# Patient Record
Sex: MALE | Race: WHITE
[De-identification: names, ages, dates, MRNs, and addresses within clinical notes are randomized per-mention and may not be internally consistent; named-entity substitution may affect disease eponyms.]

---

## 2021-03-02 ENCOUNTER — HOSPITAL ENCOUNTER (EMERGENCY)
Dept: HOSPITAL 47 - EC | Age: 31
Discharge: HOME | End: 2021-03-02
Payer: COMMERCIAL

## 2021-03-02 VITALS
HEART RATE: 86 BPM | DIASTOLIC BLOOD PRESSURE: 89 MMHG | RESPIRATION RATE: 20 BRPM | TEMPERATURE: 98 F | SYSTOLIC BLOOD PRESSURE: 131 MMHG

## 2021-03-02 DIAGNOSIS — R11.10: ICD-10-CM

## 2021-03-02 DIAGNOSIS — F31.9: ICD-10-CM

## 2021-03-02 DIAGNOSIS — F41.9: Primary | ICD-10-CM

## 2021-03-02 DIAGNOSIS — Z90.49: ICD-10-CM

## 2021-03-02 DIAGNOSIS — F12.90: ICD-10-CM

## 2021-03-02 DIAGNOSIS — G89.29: ICD-10-CM

## 2021-03-02 DIAGNOSIS — G47.00: ICD-10-CM

## 2021-03-02 DIAGNOSIS — K21.9: ICD-10-CM

## 2021-03-02 DIAGNOSIS — M54.9: ICD-10-CM

## 2021-03-02 DIAGNOSIS — Z90.09: ICD-10-CM

## 2021-03-02 DIAGNOSIS — F17.200: ICD-10-CM

## 2021-03-02 DIAGNOSIS — F14.10: ICD-10-CM

## 2021-03-02 LAB
ALBUMIN SERPL-MCNC: 4.7 G/DL (ref 3.5–5)
ALP SERPL-CCNC: 128 U/L (ref 38–126)
ALT SERPL-CCNC: 160 U/L (ref 4–49)
ANION GAP SERPL CALC-SCNC: 13 MMOL/L
APTT BLD: 23 SEC (ref 22–30)
AST SERPL-CCNC: 191 U/L (ref 17–59)
BASOPHILS # BLD AUTO: 0.1 K/UL (ref 0–0.2)
BASOPHILS NFR BLD AUTO: 1 %
BILIRUB UR QL STRIP.AUTO: (no result)
BUN SERPL-SCNC: 9 MG/DL (ref 9–20)
CALCIUM SPEC-MCNC: 9 MG/DL (ref 8.4–10.2)
CHLORIDE SERPL-SCNC: 93 MMOL/L (ref 98–107)
CO2 SERPL-SCNC: 29 MMOL/L (ref 22–30)
EOSINOPHIL # BLD AUTO: 0.1 K/UL (ref 0–0.7)
EOSINOPHIL NFR BLD AUTO: 1 %
ERYTHROCYTE [DISTWIDTH] IN BLOOD BY AUTOMATED COUNT: 4.76 M/UL (ref 4.3–5.9)
ERYTHROCYTE [DISTWIDTH] IN BLOOD: 14.8 % (ref 11.5–15.5)
GLUCOSE SERPL-MCNC: 105 MG/DL (ref 74–99)
HCT VFR BLD AUTO: 46.8 % (ref 39–53)
HGB BLD-MCNC: 15.9 GM/DL (ref 13–17.5)
HYALINE CASTS UR QL AUTO: 1 /LPF (ref 0–2)
INR PPP: 1 (ref ?–1.2)
KETONES UR QL STRIP.AUTO: (no result)
LYMPHOCYTES # SPEC AUTO: 1 K/UL (ref 1–4.8)
LYMPHOCYTES NFR SPEC AUTO: 9 %
MCH RBC QN AUTO: 33.4 PG (ref 25–35)
MCHC RBC AUTO-ENTMCNC: 33.9 G/DL (ref 31–37)
MCV RBC AUTO: 98.3 FL (ref 80–100)
MONOCYTES # BLD AUTO: 0.8 K/UL (ref 0–1)
MONOCYTES NFR BLD AUTO: 7 %
NEUTROPHILS # BLD AUTO: 8.8 K/UL (ref 1.3–7.7)
NEUTROPHILS NFR BLD AUTO: 81 %
PH UR: 6.5 [PH] (ref 5–8)
PLATELET # BLD AUTO: 145 K/UL (ref 150–450)
POTASSIUM SERPL-SCNC: 3.1 MMOL/L (ref 3.5–5.1)
PROT SERPL-MCNC: 7.6 G/DL (ref 6.3–8.2)
PROT UR QL: (no result)
PT BLD: 10.5 SEC (ref 9–12)
RBC UR QL: 3 /HPF (ref 0–5)
SODIUM SERPL-SCNC: 135 MMOL/L (ref 137–145)
SP GR UR: 1.03 (ref 1–1.03)
SQUAMOUS UR QL AUTO: <1 /HPF (ref 0–4)
UROBILINOGEN UR QL STRIP: 4 MG/DL (ref ?–2)
WBC # BLD AUTO: 10.8 K/UL (ref 3.8–10.6)
WBC #/AREA URNS HPF: 1 /HPF (ref 0–5)

## 2021-03-02 PROCEDURE — 81001 URINALYSIS AUTO W/SCOPE: CPT

## 2021-03-02 PROCEDURE — 82075 ASSAY OF BREATH ETHANOL: CPT

## 2021-03-02 PROCEDURE — 36415 COLL VENOUS BLD VENIPUNCTURE: CPT

## 2021-03-02 PROCEDURE — 96361 HYDRATE IV INFUSION ADD-ON: CPT

## 2021-03-02 PROCEDURE — 85025 COMPLETE CBC W/AUTO DIFF WBC: CPT

## 2021-03-02 PROCEDURE — 80053 COMPREHEN METABOLIC PANEL: CPT

## 2021-03-02 PROCEDURE — 85730 THROMBOPLASTIN TIME PARTIAL: CPT

## 2021-03-02 PROCEDURE — 85610 PROTHROMBIN TIME: CPT

## 2021-03-02 PROCEDURE — 80306 DRUG TEST PRSMV INSTRMNT: CPT

## 2021-03-02 PROCEDURE — 99283 EMERGENCY DEPT VISIT LOW MDM: CPT

## 2021-03-02 PROCEDURE — 83735 ASSAY OF MAGNESIUM: CPT

## 2021-03-02 PROCEDURE — 96374 THER/PROPH/DIAG INJ IV PUSH: CPT

## 2021-03-02 PROCEDURE — 96375 TX/PRO/DX INJ NEW DRUG ADDON: CPT

## 2021-03-02 PROCEDURE — 82271 OCCULT BLOOD OTHER SOURCES: CPT

## 2021-03-02 NOTE — ED
Anxiety HPI





- General


Chief Complaint: Anxiety


Stated Complaint: Anxiety


Time Seen by Provider: 03/02/21 09:24


Source: patient, EMS


Mode of arrival: EMS





- History of Present Illness


Initial Comments: 


30-year-old male history of OCD, PTSD, borderline personality disorder 

presenting to the ER today for chief complaint of insomnia.  Patient states the 

past 3 nights she has not been SEEPING he states he's been very anxious and is 

upset with his neighbor.  Patient states he does drink but doesnt feel like he 

is withdrawing stating "i am very familar with that feeling, i would be 

sweating, and have abdominal pain".  He denies any headaches or hallucinations. 

He states he has had occasional nausea and episodes of vomiting which happens 

with these "panic attacks" or if he forgets to take his protonix which he states

he did not take this mroning. Patient denies fevers, diarrhea, black/dark stools

or bloody stools. Denies chest pain, dyspnea, dyspnea on exertion, leg swelling,

jaundice. Patient states that he believes he just needs to sleep. Patient states

he does have back pain, but states this is chronic lower midline. He states when

he is anxious he feels all his chronic pains more and usually has worsening back

pain during these attacks. Patient denies suicidal or homicidal ideations. Pt 

states he came in because he just needs to get some sleep> Remaining ROS (-). 

upon arrival patient does not appear acutely psychotic he is answering questions

appropriately.








- Related Data


Home Medications: 


                                Home Medications











 Medication  Instructions  Recorded  Confirmed


 


Pantoprazole Sodium [Protonix] 40 mg PO DAILY 03/02/21 03/02/21


 


QUEtiapine [SEROquel] 400 mg PO HS 03/02/21 03/02/21


 


Vortioxetine Hydrobromide 5 mg PO HS 03/02/21 03/02/21





[Trintellix]   











Allergies/Adverse Reactions: 


                                    Allergies











Allergy/AdvReac Type Severity Reaction Status Date / Time


 


No Known Allergies Allergy   Verified 03/02/21 10:32














Review of Systems


ROS Statement: 


Those systems with pertinent positive or pertinent negative responses have been 

documented in the HPI.





ROS Other: All systems not noted in ROS Statement are negative.





Past Medical History


Past Medical History: GERD/Reflux


Additional Past Medical History / Comment(s): migraines, OCD


History of Any Multi-Drug Resistant Organisms: None Reported


Past Surgical History: Appendectomy, Cholecystectomy, Hernia Repair, 

Tonsillectomy


Past Psychological History: ADD/ADHD, Anxiety, Bipolar


Smoking Status: Current every day smoker


Past Alcohol Use History: Occasional


Past Drug Use History: Marijuana





General Exam





- General Exam Comments


Initial Comments: 


General:  The patient is awake and alert, in no distress


Eye:  Pupils are equal, round and reactive to light, extra-ocular movements are 

intact.  No nystagmus.  There is normal conjunctiva bilaterally.  No signs of 

icterus.  


Ears, nose, mouth and throat:  There are moist mucous membranes and no oral 

lesions. 


Neck:  The neck is supple, there is no tenderness or JVD.  


Cardiovascular:  There is a regular rate and rhythm. No murmur, rub or gallop is

 appreciated.


Respiratory:  Lungs are clear to auscultation, respirations are non-labored, 

breath sounds are equal.  No wheezes, stridor, rales, or rhonchi.


Gastrointestinal:  Soft, non-distended, non-tender abdomen without masses or 

organomegaly noted. There is no rebound or guarding present.  No CVA tenderness


Musculoskeletal: Some mild paraspinal tenderness to palpation fo the lumbar 

spine, remaining exam unremarkable, no midline tenderness. No bruising. No 

redness. Normal ROM, no tenderness.  Strength 5/5 of the LE b/l. Sensation 

intact of the LE b/l-including the saddle region. Radial and DP pulses equal 

bilaterally 2+.  


Neurological:  A&O x 3. CN II-XII intact grossly, There are no obvious motor or 

sensory deficits. Coordination appears grossly intact. Speech is normal.


Skin:  Skin is warm and dry and no rashes or lesions are noted. 


Psychiatric:  Cooperative, appropriate mood & affect, normal judgment.  





Limitations: no limitations





Course


                                   Vital Signs











  03/02/21 03/02/21





  09:23 14:29


 


Temperature 97.7 F 98 F


 


Pulse Rate 74 86


 


Respiratory 22 20





Rate  


 


Blood Pressure 138/95 131/89


 


O2 Sat by Pulse 95 99





Oximetry  














- Reevaluation(s)


Reevaluation #1: 


Episode of coffee ground appearing emesis. pt continues to deny physical 

complaints, aside from chronic low back pain. abdomen continues to be soft no

ntender, no crepitus to palpation of chest wall. Patient states that he KNOWS he

 is not withdrawing. He states he does take protonix daily and has bad GERD that

 sometimes causes him to vomit. Missed dose today.


03/02/21 12:41





Medical Decision Making





- Medical Decision Making


Patient presenting for anxiety/insomnia. Patient has chronic back pain, states 

it is worse, Evelyne flank pain/blood in urine. He states he has had an episode 

of vomiting today which happens when he gets worked up or doesnt take his 

protonix-which both are occurring today. Patient had one dark episode of 

vomiting but states it was the "pepsi" he was drinking. Gastric occult (-). HgB 

stable. No hypotension/tachycardia. Patient vomiting controlled. Pt does have 

mild hypokalemia/hypomagensium--both replaced orally. Anxiety improved. 

Evaluated by EPS who recommended discharge she is can go to his 3PM [facility] 

appointment. Patient agreeable to this care plan and prefers discharge at this 

time> Discussed case with Dr> Benoit who is agreeable to this care plan.








- Lab Data


Result diagrams: 


                                 03/02/21 12:45





                                 03/02/21 12:45


                                   Lab Results











  03/02/21 03/02/21 03/02/21 Range/Units





  12:38 12:45 12:45 


 


WBC   10.8 H   (3.8-10.6)  k/uL


 


RBC   4.76   (4.30-5.90)  m/uL


 


Hgb   15.9   (13.0-17.5)  gm/dL


 


Hct   46.8   (39.0-53.0)  %


 


MCV   98.3   (80.0-100.0)  fL


 


MCH   33.4   (25.0-35.0)  pg


 


MCHC   33.9   (31.0-37.0)  g/dL


 


RDW   14.8   (11.5-15.5)  %


 


Plt Count   145 L   (150-450)  k/uL


 


MPV   7.8   


 


Neutrophils %   81   %


 


Lymphocytes %   9   %


 


Monocytes %   7   %


 


Eosinophils %   1   %


 


Basophils %   1   %


 


Neutrophils #   8.8 H   (1.3-7.7)  k/uL


 


Lymphocytes #   1.0   (1.0-4.8)  k/uL


 


Monocytes #   0.8   (0-1.0)  k/uL


 


Eosinophils #   0.1   (0-0.7)  k/uL


 


Basophils #   0.1   (0-0.2)  k/uL


 


PT     (9.0-12.0)  sec


 


INR     (<1.2)  


 


APTT     (22.0-30.0)  sec


 


Sodium    135 L  (137-145)  mmol/L


 


Potassium    3.1 L  (3.5-5.1)  mmol/L


 


Chloride    93 L  ()  mmol/L


 


Carbon Dioxide    29  (22-30)  mmol/L


 


Anion Gap    13  mmol/L


 


BUN    9  (9-20)  mg/dL


 


Creatinine    0.57 L  (0.66-1.25)  mg/dL


 


Est GFR (CKD-EPI)AfAm    >90  (>60 ml/min/1.73 sqM)  


 


Est GFR (CKD-EPI)NonAf    >90  (>60 ml/min/1.73 sqM)  


 


Glucose    105 H  (74-99)  mg/dL


 


Calcium    9.0  (8.4-10.2)  mg/dL


 


Magnesium     (1.6-2.3)  mg/dL


 


Total Bilirubin    1.1  (0.2-1.3)  mg/dL


 


AST    191 H  (17-59)  U/L


 


ALT    160 H  (4-49)  U/L


 


Alkaline Phosphatase    128 H  ()  U/L


 


Total Protein    7.6  (6.3-8.2)  g/dL


 


Albumin    4.7  (3.5-5.0)  g/dL


 


Urine Color  Yellow    


 


Urine Appearance  Clear    (Clear)  


 


Urine pH  6.5    (5.0-8.0)  


 


Ur Specific Gravity  1.032    (1.001-1.035)  


 


Urine Protein  2+ H    (Negative)  


 


Urine Glucose (UA)  Negative    (Negative)  


 


Urine Ketones  4+ H    (Negative)  


 


Urine Blood  Negative    (Negative)  


 


Urine Nitrite  Negative    (Negative)  


 


Urine Bilirubin  1+ H    (Negative)  


 


Urine Urobilinogen  4.0    (<2.0)  mg/dL


 


Ur Leukocyte Esterase  Negative    (Negative)  


 


Urine RBC  3    (0-5)  /hpf


 


Urine WBC  1    (0-5)  /hpf


 


Ur Squamous Epith Cells  <1    (0-4)  /hpf


 


Hyaline Casts  1    (0-2)  /lpf


 


Urine Mucus  Many H    (None)  /hpf


 


Gastric Occult Blood     (Negative)  


 


Urine Opiates Screen  Detected H    (NotDetected)  


 


Ur Oxycodone Screen  Not Detected    (NotDetected)  


 


Urine Methadone Screen  Detected H    (NotDetected)  


 


Ur Propoxyphene Screen  Not Detected    (NotDetected)  


 


Ur Barbiturates Screen  Not Detected    (NotDetected)  


 


U Tricyclic Antidepress  Detected H    (NotDetected)  


 


Ur Phencyclidine Scrn  Not Detected    (NotDetected)  


 


Ur Amphetamines Screen  Not Detected    (NotDetected)  


 


U Methamphetamines Scrn  Not Detected    (NotDetected)  


 


U Benzodiazepines Scrn  Not Detected    (NotDetected)  


 


Urine Cocaine Screen  Detected H    (NotDetected)  


 


U Marijuana (THC) Screen  Detected H    (NotDetected)  














  03/02/21 03/02/21 03/02/21 Range/Units





  12:45 12:45 13:10 


 


WBC     (3.8-10.6)  k/uL


 


RBC     (4.30-5.90)  m/uL


 


Hgb     (13.0-17.5)  gm/dL


 


Hct     (39.0-53.0)  %


 


MCV     (80.0-100.0)  fL


 


MCH     (25.0-35.0)  pg


 


MCHC     (31.0-37.0)  g/dL


 


RDW     (11.5-15.5)  %


 


Plt Count     (150-450)  k/uL


 


MPV     


 


Neutrophils %     %


 


Lymphocytes %     %


 


Monocytes %     %


 


Eosinophils %     %


 


Basophils %     %


 


Neutrophils #     (1.3-7.7)  k/uL


 


Lymphocytes #     (1.0-4.8)  k/uL


 


Monocytes #     (0-1.0)  k/uL


 


Eosinophils #     (0-0.7)  k/uL


 


Basophils #     (0-0.2)  k/uL


 


PT  10.5    (9.0-12.0)  sec


 


INR  1.0    (<1.2)  


 


APTT  23.0    (22.0-30.0)  sec


 


Sodium     (137-145)  mmol/L


 


Potassium     (3.5-5.1)  mmol/L


 


Chloride     ()  mmol/L


 


Carbon Dioxide     (22-30)  mmol/L


 


Anion Gap     mmol/L


 


BUN     (9-20)  mg/dL


 


Creatinine     (0.66-1.25)  mg/dL


 


Est GFR (CKD-EPI)AfAm     (>60 ml/min/1.73 sqM)  


 


Est GFR (CKD-EPI)NonAf     (>60 ml/min/1.73 sqM)  


 


Glucose     (74-99)  mg/dL


 


Calcium     (8.4-10.2)  mg/dL


 


Magnesium   1.4 L   (1.6-2.3)  mg/dL


 


Total Bilirubin     (0.2-1.3)  mg/dL


 


AST     (17-59)  U/L


 


ALT     (4-49)  U/L


 


Alkaline Phosphatase     ()  U/L


 


Total Protein     (6.3-8.2)  g/dL


 


Albumin     (3.5-5.0)  g/dL


 


Urine Color     


 


Urine Appearance     (Clear)  


 


Urine pH     (5.0-8.0)  


 


Ur Specific Gravity     (1.001-1.035)  


 


Urine Protein     (Negative)  


 


Urine Glucose (UA)     (Negative)  


 


Urine Ketones     (Negative)  


 


Urine Blood     (Negative)  


 


Urine Nitrite     (Negative)  


 


Urine Bilirubin     (Negative)  


 


Urine Urobilinogen     (<2.0)  mg/dL


 


Ur Leukocyte Esterase     (Negative)  


 


Urine RBC     (0-5)  /hpf


 


Urine WBC     (0-5)  /hpf


 


Ur Squamous Epith Cells     (0-4)  /hpf


 


Hyaline Casts     (0-2)  /lpf


 


Urine Mucus     (None)  /hpf


 


Gastric Occult Blood    Negative  (Negative)  


 


Urine Opiates Screen     (NotDetected)  


 


Ur Oxycodone Screen     (NotDetected)  


 


Urine Methadone Screen     (NotDetected)  


 


Ur Propoxyphene Screen     (NotDetected)  


 


Ur Barbiturates Screen     (NotDetected)  


 


U Tricyclic Antidepress     (NotDetected)  


 


Ur Phencyclidine Scrn     (NotDetected)  


 


Ur Amphetamines Screen     (NotDetected)  


 


U Methamphetamines Scrn     (NotDetected)  


 


U Benzodiazepines Scrn     (NotDetected)  


 


Urine Cocaine Screen     (NotDetected)  


 


U Marijuana (THC) Screen     (NotDetected)  














Disposition


Clinical Impression: 


 Anxiety, Chronic back pain, Insomnia, Vomiting, Cocaine abuse





Disposition: HOME SELF-CARE


Condition: Good


Instructions (If sedation given, give patient instructions):  Generalized 

Anxiety Disorder (ED), Insomnia (ED)


Additional Instructions: 


Please use medication as discussed.  Please follow-up with family doctor in the 

next 2 day. Please follow-up with   Please return to emergency room if the 

symptoms increase or worsen or for any other concerns.


Is patient prescribed a controlled substance at d/c from ED?: No


Referrals: 


None,Stated [Primary Care Provider] - 1-2 days


[facility] - 1[facility]2 days


Time of Disposition: 14:03

## 2021-03-13 ENCOUNTER — HOSPITAL ENCOUNTER (EMERGENCY)
Dept: HOSPITAL 47 - EC | Age: 31
Discharge: HOME | End: 2021-03-13
Payer: COMMERCIAL

## 2021-03-13 VITALS
DIASTOLIC BLOOD PRESSURE: 98 MMHG | HEART RATE: 117 BPM | RESPIRATION RATE: 18 BRPM | SYSTOLIC BLOOD PRESSURE: 155 MMHG | TEMPERATURE: 97.9 F

## 2021-03-13 DIAGNOSIS — F32.9: ICD-10-CM

## 2021-03-13 DIAGNOSIS — Z76.0: Primary | ICD-10-CM

## 2021-03-13 DIAGNOSIS — K21.9: ICD-10-CM

## 2021-03-13 DIAGNOSIS — F17.200: ICD-10-CM

## 2021-03-13 DIAGNOSIS — F41.9: ICD-10-CM

## 2021-03-13 PROCEDURE — 99283 EMERGENCY DEPT VISIT LOW MDM: CPT

## 2021-03-13 NOTE — ED
General Adult HPI





- General


Chief complaint: Recheck/Abnormal Lab/Rx


Stated complaint: Nausea, Vomiting


Time Seen by Provider: 03/13/21 16:12


Source: patient


Mode of arrival: ambulatory


Limitations: no limitations





- History of Present Illness


Initial comments: 





Dictation was produced using dragon dictation software. please excuse any gr

ammatical, word or spelling errors. 





This patient was cared for during a federal and state declared state of 

emergency secondary to Covid 19





Chief Complaint: 30-year-old male presents for Protonix refill





History of Present Illness: Patient is a 30-year-old male who recently moved to 

[place].  Patient states he has a prescription for Protonix.  640 mg daily.  

Patient states he has regular reflux symptoms.  He is in the middle of changing 

primary care physicians.  Patient has no medical complaints at this time.








The ROS documented in this emergency department record has been reviewed and 

confirmed by me.  Those systems with pertinent positive or negative responses 

have been documented in the HPI.  All other systems are other negative and/or 

noncontributory.








PHYSICAL EXAM:


General Impression: Alert and oriented x3, not in acute distress


HEENT: Normocephalic atraumatic, extra-ocular movements intact, pupils equal and

reactive to light bilaterally, mucous membranes moist.


Cardiovascular: Heart regular rate and rhythm


Chest: Able to complete full sentences, no retractions, no tachypnea


Abdomen: abdomen soft, non-tender, non-distended, no organomegaly


Musculoskeletal: Pulses present and equal in all extremities, no peripheral 

edema


Motor:  no focal deficits noted


Neurological: CN II-XII grossly intact, no focal motor or sensory deficits noted


Skin: Intact with no visualized rashes


Psych: Normal affect and mood





ED course: The-year-old Male presents for request for Protonix refill.  Vital 

signs upon arrival are within acceptable limits.  Physical examination is b

enign.  Patient given refill.  Is also given referral to GI for outpatient 

management of his reflux symptoms.

















- Related Data


                                Home Medications











 Medication  Instructions  Recorded  Confirmed


 


Pantoprazole Sodium [Protonix] 40 mg PO DAILY 03/02/21 03/02/21


 


QUEtiapine [SEROquel] 400 mg PO HS 03/02/21 03/02/21


 


Vortioxetine Hydrobromide 5 mg PO HS 03/02/21 03/02/21





[Trintellix]   








                                  Previous Rx's











 Medication  Instructions  Recorded


 


Pantoprazole Sodium [Protonix] 40 mg PO DAILY 24 Days #24 03/13/21





 tablet. 











                                    Allergies











Allergy/AdvReac Type Severity Reaction Status Date / Time


 


No Known Allergies Allergy   Verified 03/13/21 16:05














Review of Systems


ROS Statement: 


Those systems with pertinent positive or pertinent negative responses have been 

documented in the HPI.





ROS Other: All systems not noted in ROS Statement are negative.





Past Medical History


Past Medical History: GERD/Reflux


Additional Past Medical History / Comment(s): migraines, OCD


History of Any Multi-Drug Resistant Organisms: None Reported


Past Surgical History: Appendectomy, Cholecystectomy, Hernia Repair, 

Tonsillectomy


Past Psychological History: ADD/ADHD, Anxiety, Bipolar


Smoking Status: Current every day smoker


Past Alcohol Use History: Occasional


Past Drug Use History: Marijuana





General Exam


Limitations: no limitations





Course





                                   Vital Signs











  03/13/21





  16:05


 


Temperature 97.9 F


 


Pulse Rate 117 H


 


Respiratory 18





Rate 


 


Blood Pressure 155/98


 


O2 Sat by Pulse 97





Oximetry 














Disposition


Clinical Impression: 


 Encounter for medication refill





Disposition: HOME SELF-CARE


Condition: Good


Instructions (If sedation given, give patient instructions):  Gastroesophageal 

Reflux Disease (ED)


Prescriptions: 


Pantoprazole Sodium [Protonix] 40 mg PO DAILY 24 Days #24 tablet.


Is patient prescribed a controlled substance at d/c from ED?: No


Referrals: 


Tyson Aguilar MD [STAFF PHYSICIAN] - 1-2 days


Time of Disposition: 16:19

## 2021-06-02 ENCOUNTER — HOSPITAL ENCOUNTER (EMERGENCY)
Dept: HOSPITAL 47 - EC | Age: 31
Discharge: LEFT BEFORE DISCHARGE | End: 2021-06-02
Payer: COMMERCIAL

## 2021-06-02 VITALS
TEMPERATURE: 97 F | DIASTOLIC BLOOD PRESSURE: 103 MMHG | SYSTOLIC BLOOD PRESSURE: 139 MMHG | HEART RATE: 80 BPM | RESPIRATION RATE: 18 BRPM

## 2021-06-02 DIAGNOSIS — F31.9: ICD-10-CM

## 2021-06-02 DIAGNOSIS — K21.9: ICD-10-CM

## 2021-06-02 DIAGNOSIS — F12.90: ICD-10-CM

## 2021-06-02 DIAGNOSIS — F41.9: ICD-10-CM

## 2021-06-02 DIAGNOSIS — R74.01: ICD-10-CM

## 2021-06-02 DIAGNOSIS — R41.0: ICD-10-CM

## 2021-06-02 DIAGNOSIS — R55: ICD-10-CM

## 2021-06-02 DIAGNOSIS — E87.6: Primary | ICD-10-CM

## 2021-06-02 DIAGNOSIS — F17.200: ICD-10-CM

## 2021-06-02 DIAGNOSIS — R56.9: ICD-10-CM

## 2021-06-02 LAB
ALBUMIN SERPL-MCNC: 4.7 G/DL (ref 3.5–5)
ALP SERPL-CCNC: 230 U/L (ref 38–126)
ALT SERPL-CCNC: 163 U/L (ref 4–49)
ANION GAP SERPL CALC-SCNC: 22 MMOL/L
AST SERPL-CCNC: 300 U/L (ref 17–59)
BASOPHILS # BLD AUTO: 0.1 K/UL (ref 0–0.2)
BASOPHILS NFR BLD AUTO: 1 %
BUN SERPL-SCNC: 7 MG/DL (ref 9–20)
CALCIUM SPEC-MCNC: 8.8 MG/DL (ref 8.4–10.2)
CHLORIDE SERPL-SCNC: 88 MMOL/L (ref 98–107)
CO2 SERPL-SCNC: 22 MMOL/L (ref 22–30)
EOSINOPHIL # BLD AUTO: 0.1 K/UL (ref 0–0.7)
EOSINOPHIL NFR BLD AUTO: 1 %
ERYTHROCYTE [DISTWIDTH] IN BLOOD BY AUTOMATED COUNT: 4.34 M/UL (ref 4.3–5.9)
ERYTHROCYTE [DISTWIDTH] IN BLOOD: 13.9 % (ref 11.5–15.5)
GLUCOSE BLD-MCNC: 153 MG/DL (ref 75–99)
GLUCOSE SERPL-MCNC: 145 MG/DL (ref 74–99)
HCT VFR BLD AUTO: 44.1 % (ref 39–53)
HGB BLD-MCNC: 15.1 GM/DL (ref 13–17.5)
LYMPHOCYTES # SPEC AUTO: 0.7 K/UL (ref 1–4.8)
LYMPHOCYTES NFR SPEC AUTO: 10 %
MAGNESIUM SPEC-SCNC: 2.2 MG/DL (ref 1.6–2.3)
MCH RBC QN AUTO: 34.9 PG (ref 25–35)
MCHC RBC AUTO-ENTMCNC: 34.3 G/DL (ref 31–37)
MCV RBC AUTO: 101.7 FL (ref 80–100)
MONOCYTES # BLD AUTO: 0.7 K/UL (ref 0–1)
MONOCYTES NFR BLD AUTO: 9 %
NEUTROPHILS # BLD AUTO: 6 K/UL (ref 1.3–7.7)
NEUTROPHILS NFR BLD AUTO: 79 %
PLATELET # BLD AUTO: 82 K/UL (ref 150–450)
POTASSIUM SERPL-SCNC: 2.7 MMOL/L (ref 3.5–5.1)
PROT SERPL-MCNC: 7.2 G/DL (ref 6.3–8.2)
SODIUM SERPL-SCNC: 132 MMOL/L (ref 137–145)
WBC # BLD AUTO: 7.6 K/UL (ref 3.8–10.6)

## 2021-06-02 PROCEDURE — 83735 ASSAY OF MAGNESIUM: CPT

## 2021-06-02 PROCEDURE — 96361 HYDRATE IV INFUSION ADD-ON: CPT

## 2021-06-02 PROCEDURE — 84100 ASSAY OF PHOSPHORUS: CPT

## 2021-06-02 PROCEDURE — 99284 EMERGENCY DEPT VISIT MOD MDM: CPT

## 2021-06-02 PROCEDURE — 36415 COLL VENOUS BLD VENIPUNCTURE: CPT

## 2021-06-02 PROCEDURE — 85025 COMPLETE CBC W/AUTO DIFF WBC: CPT

## 2021-06-02 PROCEDURE — 96374 THER/PROPH/DIAG INJ IV PUSH: CPT

## 2021-06-02 PROCEDURE — 80053 COMPREHEN METABOLIC PANEL: CPT

## 2021-06-02 PROCEDURE — 93005 ELECTROCARDIOGRAM TRACING: CPT

## 2021-06-02 PROCEDURE — 80320 DRUG SCREEN QUANTALCOHOLS: CPT

## 2021-06-02 NOTE — ED
General Adult HPI





- General


Chief complaint: Fall


Stated complaint: Syncope/Possible Seizures


Time Seen by Provider: 06/02/21 09:55


Source: patient, EMS


Mode of arrival: EMS


Limitations: no limitations





- History of Present Illness


Initial comments: 


30 year-old male patient presents to the emergency department for evaluation 

after possibly having a seizure. Patient was at the store.  reports that 

he was about to check out when he "fainted", reportedly he did exhibit some body

twitching, and was confused when he woke up. Patient did bite his lip and 

tongue. Denies any loss of bowel or bladder control. Patient denies ever having 

a seizure in the past. Does admit to drinking a pint of alcohol three times 

weekly. States he did have a pint of vodka at around 0800 this morning. He 

denies any current headache, blurred vision, double vision, nausea, or vomiting.

Denies any recent diarrhea, fever, or chills. Denies chest pain or shortness of 

breath.   Patient denies any recent rash, cough, shortness of breath, chest 

pain, abdominal pain, constipation, back pain, numbness, tingling, dizziness, 

weakness, hematuria, dysuria, urinary urgency, urinary frequency, or any other 

complaints.





- Related Data


                                Home Medications











 Medication  Instructions  Recorded  Confirmed


 


QUEtiapine FUMARATE [SEROquel] 600 mg PO HS 06/02/21 06/02/21


 


Vortioxetine Hydrobromide 20 mg PO HS 06/02/21 06/02/21





[Trintellix]   








                                  Previous Rx's











 Medication  Instructions  Recorded


 


Pantoprazole Sodium [Protonix] 40 mg PO DAILY 24 Days #24 03/13/21





 tablet. 











                                    Allergies











Allergy/AdvReac Type Severity Reaction Status Date / Time


 


No Known Allergies Allergy   Verified 06/02/21 11:08














Review of Systems


ROS Statement: 


Those systems with pertinent positive or pertinent negative responses have been 

documented in the HPI.





ROS Other: All systems not noted in ROS Statement are negative.





Past Medical History


Past Medical History: GERD/Reflux, Seizure Disorder


Additional Past Medical History / Comment(s): migraines, OCD


History of Any Multi-Drug Resistant Organisms: None Reported


Past Surgical History: Appendectomy, Cholecystectomy, Hernia Repair, 

Tonsillectomy


Additional Past Surgical History / Comment(s): 7 hernias


Past Psychological History: ADD/ADHD, Anxiety, Bipolar


Smoking Status: Current every day smoker


Past Alcohol Use History: Heavy, Occasional


Past Drug Use History: Marijuana





General Exam


Limitations: no limitations


General appearance: alert, in no apparent distress


Eye exam: Present: normal appearance, PERRL, EOMI.  Absent: scleral icterus, 

conjunctival injection, periorbital swelling


ENT exam: Present: normal exam, normal oropharynx, mucous membranes moist


Respiratory exam: Present: normal lung sounds bilaterally.  Absent: respiratory 

distress, wheezes, rales, rhonchi, stridor


Cardiovascular Exam: Present: regular rate, normal rhythm, normal heart sounds. 

Absent: systolic murmur, diastolic murmur, rubs, gallop, clicks


GI/Abdominal exam: Present: soft, normal bowel sounds.  Absent: distended, 

tenderness, guarding, rebound, rigid


Neurological exam: Present: alert, oriented X3, CN II-XII intact


Psychiatric exam: Present: normal affect, normal mood


Skin exam: Present: warm, dry, intact, normal color.  Absent: rash





Course


                                   Vital Signs











  06/02/21 06/02/21





  09:38 11:32


 


Temperature 98.5 F 97.0 F L


 


Pulse Rate 86 80


 


Respiratory 20 18





Rate  


 


Blood Pressure 143/93 139/103


 


O2 Sat by Pulse 94 L 96





Oximetry  














EKG Findings





- EKG Comments:


EKG Findings:: EKG obtained at 957 shows normal sinus rhythm with a prolonged QT

interval.  Ventricular is 72, NJ interval 128, QRS duration 92, QTc 464, QTC 

508.  No evidence of ST elevation or depression.





Medical Decision Making





- Medical Decision Making


30 year-old male patient presented for syncope vs seizure. Physical exam is 

unremarkable. He is neurologically intact, no focal deficits. Labs reviewed and 

showed critically low potassium. Transaminitis. Normal magnesium. EKG showed 

prolonged QT interval. Plan was for admission for possible alcohol withdrawal 

seizure and hypokalemia. While waiting for test results patient did refuse all 

further care and treatment and left AGAINST MEDICAL ADVICE.  I was not able to 

speak to the patient prior to his leaving however I did call him and informed 

him of his lab results, discussed his critically low potassium and discussed 

risk of possible arrhythmia and death due to this.  Patient stated, "I underst

and, I'll come back another day but not today".  I reiterated the risks of not 

returning.  Patient again verbalized understanding.  He is instructed to return 

as soon as possible for further care and treatment. Case discussed with my 

attending Dr. Torres.








- Lab Data


Result diagrams: 


                                 06/02/21 10:11





                                 06/02/21 10:11


                                   Lab Results











  06/02/21 06/02/21 06/02/21 Range/Units





  10:05 10:11 10:11 


 


WBC   7.6   (3.8-10.6)  k/uL


 


RBC   4.34   (4.30-5.90)  m/uL


 


Hgb   15.1   (13.0-17.5)  gm/dL


 


Hct   44.1   (39.0-53.0)  %


 


MCV   101.7 H   (80.0-100.0)  fL


 


MCH   34.9   (25.0-35.0)  pg


 


MCHC   34.3   (31.0-37.0)  g/dL


 


RDW   13.9   (11.5-15.5)  %


 


Plt Count   82 L   (150-450)  k/uL


 


MPV   9.2   


 


Neutrophils %   79   %


 


Lymphocytes %   10   %


 


Monocytes %   9   %


 


Eosinophils %   1   %


 


Basophils %   1   %


 


Neutrophils #   6.0   (1.3-7.7)  k/uL


 


Lymphocytes #   0.7 L   (1.0-4.8)  k/uL


 


Monocytes #   0.7   (0-1.0)  k/uL


 


Eosinophils #   0.1   (0-0.7)  k/uL


 


Basophils #   0.1   (0-0.2)  k/uL


 


Manual Slide Review   Performed   


 


Macrocytosis   Slight   


 


Sodium    132 L  (137-145)  mmol/L


 


Potassium    2.7 L*  (3.5-5.1)  mmol/L


 


Chloride    88 L  ()  mmol/L


 


Carbon Dioxide    22  (22-30)  mmol/L


 


Anion Gap    22  mmol/L


 


BUN    7 L  (9-20)  mg/dL


 


Creatinine    0.56 L  (0.66-1.25)  mg/dL


 


Est GFR (CKD-EPI)AfAm    >90  (>60 ml/min/1.73 sqM)  


 


Est GFR (CKD-EPI)NonAf    >90  (>60 ml/min/1.73 sqM)  


 


Glucose    145 H  (74-99)  mg/dL


 


POC Glucose (mg/dL)  153 H    (75-99)  mg/dL


 


POC Glu Operater ID  Thomas Baig    


 


Calcium    8.8  (8.4-10.2)  mg/dL


 


Phosphorus    3.6  (2.5-4.5)  mg/dL


 


Magnesium    2.2  (1.6-2.3)  mg/dL


 


Total Bilirubin    2.3 H  (0.2-1.3)  mg/dL


 


AST    300 H  (17-59)  U/L


 


ALT    163 H  (4-49)  U/L


 


Alkaline Phosphatase    230 H  ()  U/L


 


Total Protein    7.2  (6.3-8.2)  g/dL


 


Albumin    4.7  (3.5-5.0)  g/dL


 


Serum Alcohol    <10  mg/dL














Disposition


Clinical Impression: 


 Hypokalemia, Seizure, Transaminitis





Disposition: Left Against Medical Advice


Condition: Undetermined


Referrals: 


None,Stated [Primary Care Provider] - 1-2 days

## 2021-08-19 ENCOUNTER — HOSPITAL ENCOUNTER (INPATIENT)
Dept: HOSPITAL 47 - EC | Age: 31
LOS: 2 days | Discharge: LEFT BEFORE DISCHARGE | DRG: 894 | End: 2021-08-21
Attending: FAMILY MEDICINE | Admitting: FAMILY MEDICINE
Payer: COMMERCIAL

## 2021-08-19 DIAGNOSIS — F17.200: ICD-10-CM

## 2021-08-19 DIAGNOSIS — S00.31XA: ICD-10-CM

## 2021-08-19 DIAGNOSIS — R45.851: ICD-10-CM

## 2021-08-19 DIAGNOSIS — K21.9: ICD-10-CM

## 2021-08-19 DIAGNOSIS — R40.20: ICD-10-CM

## 2021-08-19 DIAGNOSIS — G40.909: ICD-10-CM

## 2021-08-19 DIAGNOSIS — Z79.82: ICD-10-CM

## 2021-08-19 DIAGNOSIS — S00.81XA: ICD-10-CM

## 2021-08-19 DIAGNOSIS — Z79.899: ICD-10-CM

## 2021-08-19 DIAGNOSIS — K70.10: ICD-10-CM

## 2021-08-19 DIAGNOSIS — F10.229: Primary | ICD-10-CM

## 2021-08-19 DIAGNOSIS — F33.2: ICD-10-CM

## 2021-08-19 DIAGNOSIS — S00.83XA: ICD-10-CM

## 2021-08-19 DIAGNOSIS — S01.511A: ICD-10-CM

## 2021-08-19 DIAGNOSIS — E87.6: ICD-10-CM

## 2021-08-19 DIAGNOSIS — F42.9: ICD-10-CM

## 2021-08-19 DIAGNOSIS — R74.01: ICD-10-CM

## 2021-08-19 LAB
ALBUMIN SERPL-MCNC: 3.4 G/DL (ref 3.5–5)
ALP SERPL-CCNC: 489 U/L (ref 38–126)
ALT SERPL-CCNC: 90 U/L (ref 4–49)
ANION GAP SERPL CALC-SCNC: 12 MMOL/L
AST SERPL-CCNC: 481 U/L (ref 17–59)
BASOPHILS # BLD AUTO: 0 K/UL (ref 0–0.2)
BASOPHILS NFR BLD AUTO: 0 %
BUN SERPL-SCNC: 4 MG/DL (ref 9–20)
CALCIUM SPEC-MCNC: 8 MG/DL (ref 8.4–10.2)
CHLORIDE SERPL-SCNC: 85 MMOL/L (ref 98–107)
CO2 SERPL-SCNC: 30 MMOL/L (ref 22–30)
EOSINOPHIL # BLD AUTO: 0 K/UL (ref 0–0.7)
EOSINOPHIL NFR BLD AUTO: 0 %
ERYTHROCYTE [DISTWIDTH] IN BLOOD BY AUTOMATED COUNT: 4.08 M/UL (ref 4.3–5.9)
ERYTHROCYTE [DISTWIDTH] IN BLOOD: 14.5 % (ref 11.5–15.5)
GLUCOSE SERPL-MCNC: 196 MG/DL (ref 74–99)
HCT VFR BLD AUTO: 43.5 % (ref 39–53)
HGB BLD-MCNC: 15 GM/DL (ref 13–17.5)
LIPASE SERPL-CCNC: 320 U/L (ref 23–300)
LYMPHOCYTES # SPEC AUTO: 0.4 K/UL (ref 1–4.8)
LYMPHOCYTES NFR SPEC AUTO: 6 %
MAGNESIUM SPEC-SCNC: 1.5 MG/DL (ref 1.6–2.3)
MCH RBC QN AUTO: 36.9 PG (ref 25–35)
MCHC RBC AUTO-ENTMCNC: 34.6 G/DL (ref 31–37)
MCV RBC AUTO: 106.8 FL (ref 80–100)
MONOCYTES # BLD AUTO: 0.3 K/UL (ref 0–1)
MONOCYTES NFR BLD AUTO: 5 %
NEUTROPHILS # BLD AUTO: 5.7 K/UL (ref 1.3–7.7)
NEUTROPHILS NFR BLD AUTO: 88 %
PH UR: 7 [PH] (ref 5–8)
PLATELET # BLD AUTO: 42 K/UL (ref 150–450)
POTASSIUM SERPL-SCNC: 2.7 MMOL/L (ref 3.5–5.1)
PROT SERPL-MCNC: 6.1 G/DL (ref 6.3–8.2)
SODIUM SERPL-SCNC: 127 MMOL/L (ref 137–145)
SP GR UR: 1.01 (ref 1–1.03)
UROBILINOGEN UR QL STRIP: 4 MG/DL (ref ?–2)
WBC # BLD AUTO: 6.5 K/UL (ref 3.8–10.6)

## 2021-08-19 PROCEDURE — 90715 TDAP VACCINE 7 YRS/> IM: CPT

## 2021-08-19 PROCEDURE — 99291 CRITICAL CARE FIRST HOUR: CPT

## 2021-08-19 PROCEDURE — 36415 COLL VENOUS BLD VENIPUNCTURE: CPT

## 2021-08-19 PROCEDURE — 83735 ASSAY OF MAGNESIUM: CPT

## 2021-08-19 PROCEDURE — 85025 COMPLETE CBC W/AUTO DIFF WBC: CPT

## 2021-08-19 PROCEDURE — 81003 URINALYSIS AUTO W/O SCOPE: CPT

## 2021-08-19 PROCEDURE — 83690 ASSAY OF LIPASE: CPT

## 2021-08-19 PROCEDURE — 96365 THER/PROPH/DIAG IV INF INIT: CPT

## 2021-08-19 PROCEDURE — 80053 COMPREHEN METABOLIC PANEL: CPT

## 2021-08-19 PROCEDURE — 70486 CT MAXILLOFACIAL W/O DYE: CPT

## 2021-08-19 PROCEDURE — 80320 DRUG SCREEN QUANTALCOHOLS: CPT

## 2021-08-19 PROCEDURE — 80306 DRUG TEST PRSMV INSTRMNT: CPT

## 2021-08-19 PROCEDURE — 96366 THER/PROPH/DIAG IV INF ADDON: CPT

## 2021-08-19 PROCEDURE — 93005 ELECTROCARDIOGRAM TRACING: CPT

## 2021-08-19 PROCEDURE — 70450 CT HEAD/BRAIN W/O DYE: CPT

## 2021-08-19 PROCEDURE — 96375 TX/PRO/DX INJ NEW DRUG ADDON: CPT

## 2021-08-19 PROCEDURE — 82075 ASSAY OF BREATH ETHANOL: CPT

## 2021-08-19 RX ADMIN — SODIUM CHLORIDE SCH MLS/HR: 9 INJECTION, SOLUTION INTRAVENOUS at 13:29

## 2021-08-19 RX ADMIN — SODIUM CHLORIDE SCH MLS/HR: 9 INJECTION, SOLUTION INTRAVENOUS at 15:27

## 2021-08-19 NOTE — ED
Psych HPI





- General


Chief Complaint: Psychiatric Symptoms


Stated Complaint: mental health


Time Seen by Provider: 08/19/21 10:34


Source: patient, RN notes reviewed


Mode of arrival: wheelchair


Limitations: no limitations





- History of Present Illness


Initial Comments: 





This a 30-year-old male present emergency from chief complaint of depression, 

suicidal ideation, alcohol abuse.  Patient has not been feeling well possibly 

had a fall, seizure versus syncopal episode last night.  Patient woke up with 

lip laceration, facial contusions and abrasions.  He is unsure how this 

happened.  He states he drinks multiple pints per day.  Patient is brought to 

emergency department by [facility].  Patient does have a history of hypomagnesemia, 

hypokalemia, transaminitis.





- Related Data


                                Home Medications











 Medication  Instructions  Recorded  Confirmed


 


QUEtiapine FUMARATE [SEROquel] 600 mg PO HS 06/02/21 08/19/21


 


Vortioxetine Hydrobromide 20 mg PO HS 06/02/21 08/19/21





[Trintellix]   


 


Aspirin/Acetaminophen/Caffeine 1 tab PO DAILY PRN 08/19/21 08/19/21





[Excedrin Migraine Caplet]   


 


Mirtazapine [Remeron] 15 mg PO HS 08/19/21 08/19/21








                                  Previous Rx's











 Medication  Instructions  Recorded


 


Pantoprazole Sodium [Protonix] 40 mg PO DAILY 24 Days #24 03/13/21





 tablet. 











                                    Allergies











Allergy/AdvReac Type Severity Reaction Status Date / Time


 


No Known Allergies Allergy   Verified 08/19/21 12:03














Review of Systems


ROS Statement: 


Those systems with pertinent positive or pertinent negative responses have been 

documented in the HPI.





ROS Other: All systems not noted in ROS Statement are negative.





Past Medical History


Past Medical History: GERD/Reflux, Seizure Disorder


Additional Past Medical History / Comment(s): migraines, OCD


History of Any Multi-Drug Resistant Organisms: None Reported


Past Surgical History: Appendectomy, Cholecystectomy, Hernia Repair, 

Tonsillectomy


Additional Past Surgical History / Comment(s): 7 hernias


Past Psychological History: ADD/ADHD, Anxiety, Bipolar


Smoking Status: Current every day smoker


Past Alcohol Use History: Abuse, Daily, Heavy


Past Drug Use History: Marijuana





General Exam


Limitations: no limitations


General appearance: alert, in no apparent distress


Head exam: Present: atraumatic, normocephalic, normal inspection


Eye exam: Present: normal appearance, PERRL, EOMI.  Absent: scleral icterus, 

conjunctival injection, periorbital swelling


ENT exam: Present: normal oropharynx, mucous membranes moist.  Absent: normal 

exam (Multiple facial abrasions, ecchymotic areas, small lip laceration 

superficial)


Neck exam: Present: normal inspection.  Absent: tenderness, meningismus, 

lymphadenopathy


Respiratory exam: Present: normal lung sounds bilaterally.  Absent: respiratory 

distress, wheezes, rales, rhonchi, stridor


Cardiovascular Exam: Present: normal rhythm, tachycardia, normal heart sounds.  

Absent: systolic murmur, diastolic murmur, rubs, gallop, clicks


GI/Abdominal exam: Present: soft, normal bowel sounds.  Absent: distended, 

tenderness, guarding, rebound, rigid





Course


                                   Vital Signs











  08/19/21 08/19/21 08/19/21





  10:28 12:27 12:34


 


Temperature 98.2 F  99.1 F


 


Pulse Rate 125 H 99 


 


Respiratory 20  16





Rate   


 


Blood Pressure 125/85 121/85 


 


O2 Sat by Pulse 96  95





Oximetry   














Medical Decision Making





- Medical Decision Making





CT of brain, facial bones show no acute abnormality.  Patient found to have 

hypokalemia, hyponatremia with alcoholic hepatitis.  Patient was ordered 

potassium replacement, IV fluids was placed on CIWA with Ativan withdrawal 

protocol





- Lab Data


Result diagrams: 


                                 08/19/21 11:05





                                 08/19/21 11:05


                                   Lab Results











  08/19/21 08/19/21 Range/Units





  11:05 11:05 


 


WBC  6.5   (3.8-10.6)  k/uL


 


RBC  4.08 L   (4.30-5.90)  m/uL


 


Hgb  15.0   (13.0-17.5)  gm/dL


 


Hct  43.5   (39.0-53.0)  %


 


MCV  106.8 H   (80.0-100.0)  fL


 


MCH  36.9 H   (25.0-35.0)  pg


 


MCHC  34.6   (31.0-37.0)  g/dL


 


RDW  14.5   (11.5-15.5)  %


 


Plt Count  42 L   (150-450)  k/uL


 


MPV  12.3   


 


Neutrophils %  88   %


 


Lymphocytes %  6   %


 


Monocytes %  5   %


 


Eosinophils %  0   %


 


Basophils %  0   %


 


Neutrophils #  5.7   (1.3-7.7)  k/uL


 


Lymphocytes #  0.4 L   (1.0-4.8)  k/uL


 


Monocytes #  0.3   (0-1.0)  k/uL


 


Eosinophils #  0.0   (0-0.7)  k/uL


 


Basophils #  0.0   (0-0.2)  k/uL


 


Manual Slide Review  Performed   


 


Hypochromasia  Slight   


 


Poikilocytosis  Slight   


 


Macrocytosis  Moderate   


 


Sodium   127 L  (137-145)  mmol/L


 


Potassium   2.7 L*  (3.5-5.1)  mmol/L


 


Chloride   85 L  ()  mmol/L


 


Carbon Dioxide   30  (22-30)  mmol/L


 


Anion Gap   12  mmol/L


 


BUN   4 L  (9-20)  mg/dL


 


Creatinine   0.52 L  (0.66-1.25)  mg/dL


 


Est GFR (CKD-EPI)AfAm   >90  (>60 ml/min/1.73 sqM)  


 


Est GFR (CKD-EPI)NonAf   >90  (>60 ml/min/1.73 sqM)  


 


Glucose   196 H  (74-99)  mg/dL


 


Calcium   8.0 L  (8.4-10.2)  mg/dL


 


Magnesium   1.5 L  (1.6-2.3)  mg/dL


 


Total Bilirubin   3.1 H  (0.2-1.3)  mg/dL


 


AST   481 H  (17-59)  U/L


 


ALT   90 H  (4-49)  U/L


 


Alkaline Phosphatase   489 H  ()  U/L


 


Total Protein   6.1 L  (6.3-8.2)  g/dL


 


Albumin   3.4 L  (3.5-5.0)  g/dL


 


Lipase   320 H  ()  U/L


 


Serum Alcohol   <10  mg/dL














- EKG Data


-: EKG Interpreted by Me


EKG Comments: 





EKG performed at 12:41 sinus tachycardia rate of 113  QRS 82 QT/QTC 

376/515





Critical Care Time


Critical Care Time: Yes


Total Critical Care Time: 35





Disposition


Clinical Impression: 


 Depression, Hypomagnesemia, Hypokalemia, Alcohol withdrawal, Facial contusion, 

Alcoholic hepatitis





Disposition: ADMITTED AS IP TO THIS [facility]


Condition: Fair


Referrals: 


None,Stated [REFERRING] - 1-2 days

## 2021-08-19 NOTE — CT
EXAMINATION TYPE: CT facial bones wo con

 

DATE OF EXAM: 8/19/2021

 

COMPARISON: None

 

HISTORY: Seizure per patient. Multiple facial injuries.

 

CT DLP: 1375 mGycm

Automated exposure control for dose reduction was used.

 

TECHNIQUE: CT scan of the sinuses is performed without contrast, axial images are obtained, coronal r
eformatted images are also reviewed.

 

FINDINGS: The  paranasal sinuses including the frontal, ethmoid, sphenoid, and maxillary sinuses bila
terally are well-aerated without abnormal opacification.  The ostiomeatal complex is patent bilateral
ly on the coronal images. 

 

Visualized portion of mastoid air cells show no abnormal opacification.  The globes are intact bilate
rally.   Difficult of the C1 posterior elements. Prevertebral soft tissue edema suspected overlying t
he frontal bone and left orbit greater than right. Orbits are intact. Changes of chronic mild sinusit
is.

 

IMPRESSION: 

1. No acute fracture.

## 2021-08-19 NOTE — CT
EXAMINATION TYPE: CT brain wo con

 

DATE OF EXAM: 8/19/2021

 

COMPARISON: None

 

HISTORY: Seizure per patient. Multiple facial injuries.

 

CT DLP: 1379.4 mGycm.  Automated Exposure Control for Dose Reduction was Utilized.

 

 

TECHNIQUE: CT scan of the head is performed without contrast.

 

FINDINGS:   There is no acute intracranial hemorrhage, mass effect, or midline shift identified.  The
 ventricles and sulci are within normal limits in size.  The globes are intact and the visualized sin
uses are clear. There is preseptal soft tissue edema overlying the left orbit.

 

Craniocervical junction maintained. Sella turcica has a normal appearance.

 

IMPRESSION:  No acute intracranial hemorrhage, mass effect, or midline shift is seen.

## 2021-08-20 LAB
ALBUMIN SERPL-MCNC: 2.8 G/DL (ref 3.5–5)
ALBUMIN/GLOB SERPL: 1 {RATIO}
ALP SERPL-CCNC: 373 U/L (ref 38–126)
ALT SERPL-CCNC: 72 U/L (ref 4–49)
ANION GAP SERPL CALC-SCNC: 5 MMOL/L
AST SERPL-CCNC: 300 U/L (ref 17–59)
BASOPHILS # BLD AUTO: 0 K/UL (ref 0–0.2)
BASOPHILS NFR BLD AUTO: 1 %
BUN SERPL-SCNC: 5 MG/DL (ref 9–20)
CALCIUM SPEC-MCNC: 8.1 MG/DL (ref 8.4–10.2)
CHLORIDE SERPL-SCNC: 100 MMOL/L (ref 98–107)
CO2 SERPL-SCNC: 29 MMOL/L (ref 22–30)
EOSINOPHIL # BLD AUTO: 0.1 K/UL (ref 0–0.7)
EOSINOPHIL NFR BLD AUTO: 2 %
ERYTHROCYTE [DISTWIDTH] IN BLOOD BY AUTOMATED COUNT: 3.62 M/UL (ref 4.3–5.9)
ERYTHROCYTE [DISTWIDTH] IN BLOOD: 13.5 % (ref 11.5–15.5)
GLOBULIN SER CALC-MCNC: 2.7 G/DL
GLUCOSE SERPL-MCNC: 99 MG/DL (ref 74–99)
HCT VFR BLD AUTO: 39 % (ref 39–53)
HGB BLD-MCNC: 13.6 GM/DL (ref 13–17.5)
LYMPHOCYTES # SPEC AUTO: 1.2 K/UL (ref 1–4.8)
LYMPHOCYTES NFR SPEC AUTO: 23 %
MCH RBC QN AUTO: 37.7 PG (ref 25–35)
MCHC RBC AUTO-ENTMCNC: 35 G/DL (ref 31–37)
MCV RBC AUTO: 108 FL (ref 80–100)
MONOCYTES # BLD AUTO: 0.4 K/UL (ref 0–1)
MONOCYTES NFR BLD AUTO: 8 %
NEUTROPHILS # BLD AUTO: 3.3 K/UL (ref 1.3–7.7)
NEUTROPHILS NFR BLD AUTO: 65 %
PLATELET # BLD AUTO: 38 K/UL (ref 150–450)
POTASSIUM SERPL-SCNC: 3.2 MMOL/L (ref 3.5–5.1)
PROT SERPL-MCNC: 5.5 G/DL (ref 6.3–8.2)
SODIUM SERPL-SCNC: 134 MMOL/L (ref 137–145)
WBC # BLD AUTO: 5.1 K/UL (ref 3.8–10.6)

## 2021-08-20 RX ADMIN — NICOTINE SCH PATCH: 21 PATCH, EXTENDED RELEASE TRANSDERMAL at 23:37

## 2021-08-20 NOTE — P.CN
Psychiatric Consult





- .


Consult date: 08/20/21


Consult:: 





08/20/21 14:14


IDENTIFYING DATA: This patient is a single, on Social Security, 30-year-old 

 male who was admitted to the hospital for depression, suicidal 

ideation, and alcohol abuse.





HISTORY OF PRESENT ILLNESS: The patient presented to the hospital on 08/19/21, 

brought to the emergency department on the recommendation of his  

after the patient endorsed worsening depression and suicidal ideation.  

Furthermore, the patient most recently had a syncopal episode which resulted in 

multiple lacerations, abrasions, contusions on his face.  The patient reports 

that his  was increasingly worried about his depression.  Patient 

states that he has "always dealt with depression."  He reports that for the past

year, he has been expressing worsening depression with symptoms of decreased 

sleep, decreased appetite, feelings of hopelessness, helplessness, and decreased

hygiene.  Furthermore, the patient does admit that he had some suicidal ideation

but no active intention or plan.  He reports that he just had thoughts "that it 

would be better off if he was just not present."  He denies any prior attempts 

at suicide.  He does endorse significant symptoms of bipolar disorder.  He 

reports that he would be awake for 3-4 days with little to no sleep.  He does 

state that during these times, the patient does experience some manic symptoms 

including racing thoughts, impulsivity, and excessive spending.  Prior to his 

presentation at the hospital, the patient is prescribed a regimen of trintellix,

Remeron, and Seroquel.  He reports that he was only taking the Seroquel as he 

felt like the other 2 medications provided no benefit at all. The patient is 

also facing eviction at the end of this month causing him elevated anxiety and 

depression.  The patient reports that he was likely to go to a [facility] quarter 

house at the recommendation of his therapist.





The patient states that he would not like to be admitted to the psychiatric 

unit.  He reports that he just wants to smoke a cigarette.  He was informed that

he is likely to be admitted involuntarily to which she responds that he would be

"very angry if I was."


The patient does report a significant history of traumatic brain injuries.  He 

states that there have been multiple concussions when he was much younger.  He 

denies any significant history of physical, sexual, or emotional abuse.





PAST PSYCHIATRIC HISTORY: Patient has a history of depression, alcohol use 

disorder, opiate use disorder, and anxiety.  The patient reports that he has 

been on "every antidepressant and every antianxiety medication there is."  The 

patient reports that he has had 4 or 5 inpatient psychiatric admissions but is 

uncertain of when he was last admitted.  The patient reports he has a case 

manager and therapist.  Patient denies any history of suicide attempts in the 

past.





PAST MEDICAL HISTORY: 


Past Medical History: GERD/Reflux, Seizure Disorder


Additional Past Medical History / Comment(s): migraines, OCD


History of Any Multi-Drug Resistant Organisms: None Reported


Past Surgical History: Appendectomy, Cholecystectomy, Hernia Repair, 

Tonsillectomy


Additional Past Surgical History / Comment(s): 7 hernias


Past Psychological History: ADD/ADHD, Anxiety, Bipolar


Smoking Status: Current every day smoker


Past Alcohol Use History: Abuse, Daily, Heavy


Past Drug Use History: Marijuana





ALLERGIES: as per EMR. 





CHEMICAL DEPENDENCY HISTORY: The patient engages in heavy substance abuse.  He 

reports he smokes 2-3 packs per day.  The patient further consumes 1-2 pints of 

hard liquor per day.  He also states that he has daily marijuana use.  He has 

previously abused heroin and opiates but states that this has not been for many 

years.  He states that he began using substances such as alcohol and marijuana 

when he was 8 years old.  





FAMILY PSYCHIATRIC/SUBSTANCE USE HISTORY: The patient reports that "everyone in 

my family has mental health problems."  He reports no history of Suicide.





SOCIAL HISTORY: Patient was born and raised in [place], [place].  He currently 

lives by himself but is being evicted at the end of this month.  He has been 

intrusive into rehabilitation for heroin abuse but this was many years ago.  He 

currently collects Social Security.  He is single with no children..





MENTAL STATUS EXAM: 


General Appearance: Patient appears to be stated age is alert, obstinate and 

uncooperative. Patient appears to have very poor hygiene and grooming wearing 

hospital gown with poor eye contact.


Behavior: Patient is calmly lying in bed without any agitated behavior.  Eye 

contact is poor.  Psychomotor activity is slow.


Speech: Patient's speech is fluent and nonpressured.  Spontaneous, monotone, 

slow to respond.


Mood/Affect: Patient reports their mood is "depressed", affect is congruent, 

irritable, dysphoric.


Suicidality/Homicidality:  Patient denies having any suicidal or homicidal 

ideation intent or plan.  


Perceptions: Patient denies any visual hallucinations and denies any auditory 

hallucinations  


Though content/process: There is no evidence of any delusional thought content 

and thought process is linear and goal-directed. 


Memory and concentration: AOX3, grossly intact for the purposes of this session.

Can spell "WORLD" backwards


Judgment and insight: Poor





                                   Vital Signs











Temp  98.5 F   08/20/21 11:22


 


Pulse  101 H  08/20/21 13:09


 


Resp  20   08/20/21 13:09


 


BP  132/56   08/20/21 13:09


 


Pulse Ox  99   08/20/21 13:09








                                 Intake & Output











 08/19/21 08/20/21 08/20/21





 18:59 06:59 18:59


 


Output Total  950 800


 


Balance  -950 -800


 


Weight 79.379 kg  


 


Output:   


 


  Urine  950 800








                       Laboratory Results - Last 24 Hours











  08/19/21 08/20/21 08/20/21





  13:49 09:18 09:18


 


WBC   5.1 


 


RBC   3.62 L 


 


Hgb   13.6 


 


Hct   39.0 


 


MCV   108.0 H 


 


MCH   37.7 H 


 


MCHC   35.0 


 


RDW   13.5 


 


Plt Count   38 L 


 


MPV   13.1 


 


Neutrophils %   65 


 


Lymphocytes %   23 


 


Monocytes %   8 


 


Eosinophils %   2 


 


Basophils %   1 


 


Neutrophils #   3.3 


 


Lymphocytes #   1.2 


 


Monocytes #   0.4 


 


Eosinophils #   0.1 


 


Basophils #   0.0 


 


Manual Slide Review   Performed 


 


Large Platelets   Present 


 


Macrocytosis   Moderate 


 


Sodium    134 L


 


Potassium    3.2 L


 


Chloride    100


 


Carbon Dioxide    29


 


Anion Gap    5


 


BUN    5 L


 


Creatinine    0.47 L


 


Est GFR (CKD-EPI)AfAm    >90


 


Est GFR (CKD-EPI)NonAf    >90


 


Glucose    99


 


Calcium    8.1 L


 


Total Bilirubin    2.8 H


 


AST    300 H


 


ALT    72 H


 


Alkaline Phosphatase    373 H


 


Total Protein    5.5 L


 


Albumin    2.8 L


 


Globulin    2.7


 


Albumin/Globulin Ratio    1.0


 


Urine Opiates Screen  Not Detected  


 


Ur Oxycodone Screen  Not Detected  


 


Urine Methadone Screen  Not Detected  


 


Ur Propoxyphene Screen  Not Detected  


 


Ur Barbiturates Screen  Not Detected  


 


U Tricyclic Antidepress  Detected H  


 


Ur Phencyclidine Scrn  Not Detected  


 


Ur Amphetamines Screen  Not Detected  


 


U Methamphetamines Scrn  Not Detected  


 


U Benzodiazepines Scrn  Not Detected  


 


Urine Cocaine Screen  Not Detected  


 


U Marijuana (THC) Screen  Detected H  











IMPRESSIONS: 


Major depressive disorder, recurrent, severe, with anxious features


Alcohol use disorder


Cannabis use disorder


Nicotine dependence





PLAN: 


-At this time patient DOES meet criteria for inpatient psychiatric admission.  

The patient is endorsing significant symptoms of depression that is impeding his

ability to attend to his ADLs.  Furthermore, the patient is dealing with chronic

suicidal ideation.





-Delirium precautions recommended with patient including - avoiding use of 

narcotics and CNS sedatives, limit anticholinergic medications when possible, 

frequent re-orientation, minimize use of restraints, open window shades during 

the day and close them at night


-Would recommend the following medication changes/additions: 


We'll restart some of the patient's medications including Remeron at 15 mg by 

mouth at bedtime for depression/insomnia/appetite stimulation and Seroquel 300 

mg by mouth at bedtime for mood augmentation/stabilization/insomnia.  We will 

hold Trintellix at this time due to concern for syncopal vs seizure episode.





-CIWA protocol recommended with Ativan PRN.





-Continue 1:1 sitter for safety





-Cannot leave AMA at this time. Patient will need a petition and certification 

if attempting to leave AMA.





-When medically stable, patient is eligible for transfer to a psych bed when 

available. 





-Psychiatry will sign off at this point, please contact with any questions.








08/20/21 14:14

## 2021-08-21 VITALS
RESPIRATION RATE: 14 BRPM | DIASTOLIC BLOOD PRESSURE: 94 MMHG | SYSTOLIC BLOOD PRESSURE: 144 MMHG | HEART RATE: 116 BPM | TEMPERATURE: 98.2 F

## 2021-08-21 LAB
ALBUMIN SERPL-MCNC: 2.8 G/DL (ref 3.5–5)
ALBUMIN/GLOB SERPL: 1 {RATIO}
ALP SERPL-CCNC: 409 U/L (ref 38–126)
ALT SERPL-CCNC: 60 U/L (ref 4–49)
ANION GAP SERPL CALC-SCNC: 7 MMOL/L
AST SERPL-CCNC: 187 U/L (ref 17–59)
BUN SERPL-SCNC: 6 MG/DL (ref 9–20)
CALCIUM SPEC-MCNC: 8.2 MG/DL (ref 8.4–10.2)
CHLORIDE SERPL-SCNC: 102 MMOL/L (ref 98–107)
CO2 SERPL-SCNC: 28 MMOL/L (ref 22–30)
GLOBULIN SER CALC-MCNC: 2.7 G/DL
GLUCOSE SERPL-MCNC: 137 MG/DL (ref 74–99)
POTASSIUM SERPL-SCNC: 3.1 MMOL/L (ref 3.5–5.1)
PROT SERPL-MCNC: 5.5 G/DL (ref 6.3–8.2)
SODIUM SERPL-SCNC: 137 MMOL/L (ref 137–145)

## 2021-08-21 RX ADMIN — POTASSIUM CHLORIDE SCH MEQ: 20 TABLET, EXTENDED RELEASE ORAL at 14:14

## 2021-08-21 RX ADMIN — POTASSIUM CHLORIDE SCH MEQ: 20 TABLET, EXTENDED RELEASE ORAL at 15:04

## 2021-08-21 RX ADMIN — NICOTINE SCH PATCH: 21 PATCH, EXTENDED RELEASE TRANSDERMAL at 08:54

## 2021-08-21 NOTE — DS
DISCHARGE SUMMARY



CHIEF COMPLAINT:

Acute alcohol intoxication and coma with history of possible seizure.



HISTORY OF PRESENT ILLNESS AND PHYSICAL EXAMINATION:

Details of this man's history and physical can be found in the initial workup.



LABORATORY STUDIES:

While he was in the hospital he had laboratory studies, details of which can be found

in the laboratory section of his chart.



COURSE IN THE HOSPITAL:

After admission he was placed on bedrest and started on intravenous fluids until he

became more awake and alert.  He was seen by Psychiatry.  He has had a history of

depression.  For reasons which were not clear to me, the patient was committed.  He

became more awake and alert and was doing well on 8/21/2021. In my opinion, he did not

seem to be psychotic or suicidal or homicidal.  Psychiatry left a note that the patient

voluntarily agreed to go to [facility].  Later I received notification from the

psychiatrist that said that he could go home.  Before he could be discharged, he signed

out AMA.



FINAL DIAGNOSIS:

1. Acute alcohol intoxication with coma.

2. Possible alcohol-related seizure.

3. Chronic alcoholism.

4. Depression.

5. Facial abrasions.

6. Hypokalemia.



OPERATIONS:

None.



CONSULTATION:

Psychiatry.



He is improved.





MMODL / IJN: [id number] / Job#: [id number]

## 2021-08-21 NOTE — PN
PROGRESS NOTE



DATE OF SERVICE:

08/20/2021



CHIEF COMPLAINT:

Acute alcohol intoxication with seizure.



HISTORY OF PRESENT ILLNESS:

This gentleman is doing well.  He is waking up and he is doing better.  For some

reason, he has been seen by Psychiatry, who has committed him.  Patient states that he

is not homicidal, suicidal, and he is not psychotic.



PHYSICAL EXAMINATION:

Chest is clear.  Cardiac exam is normal.  The abdomen is soft, nontender.



IMPRESSION:

1. Acute alcohol intoxication.

2. Chronic alcoholism.

3. Facial abrasions.

4. Depression.



PLAN:

Increase activity and probably hold for another 24-48 hours on seizure precautions.





MMODL / IJN: [id number] / Job#: [id number]

## 2021-08-21 NOTE — HP
HISTORY AND PHYSICAL



DATE OF ADMISSION:

08/19/2021



CHIEF COMPLAINT:

Acute alcohol intoxication with coma with a longstanding history of chronic alcoholism.

He may also have had a seizure.



HISTORY OF PRESENT ILLNESS:

This is another admission this 30-year-old chronic alcoholic.  He has been in and out

of the emergency room frequently.  He came back in acutely intoxicated with a history

of possible seizure.  He had some facial abrasions on the nose and right cheek.



REVIEW OF SYSTEMS:

Review of systems is unobtainable.  He is not arousable.



Past medical history, family history, and personal and social histories are not

obtainable because he is not arousable.  He has been in the office and HE DOES NOT HAVE

ANY ALLERGIES.  The only medication that he has been on is pantoprazole.  He has had

some problems with depression in the past.  Surgically he has had an appendectomy and a

cholecystectomy as well as hemorrhoidectomy and several hernia procedures.  He does

smoke.



PHYSICAL EXAMINATION:

Blood pressure is 130/80 with a pulse of 110, respirations of 12, and he is afebrile.

In general he appeared to be well developed, well nourished, in no acute distress.

Skin color is normal. Skin is warm and dry. Lymph nodes are not enlarged.  Head, ears,

eyes, nose and mouth were normal and neck veins did not appear to be distended.  Chest

was clear.  Cardiac exam demonstrated sinus tachycardia. His abdomen was soft and

nontender.  Extremities seemed to be normal.  Neurologically he is very lethargic. The

only abnormal findings were abrasions on the nose and right cheek.



He is admitted to the hospital with the diagnoses:

1. Acute alcohol intoxication.

2. Chronic alcoholism.

3. Possible alcohol-related seizure.



PLAN:

1. Bedrest.

2. IV fluids.

3. Bedside sitter.





MMODL / IJN: [id number] / Job#: [id number]

## 2021-08-21 NOTE — P.CON
Consult Note





- .


Consult date: 08/21/21


Assessment/Plan:: 


Clinical Problems: Alcohol withdrawal, alcohol use disorder severe, rule out 

alcohol withdrawal seizure, alcohol induced mood disorder, cannabis use 

disorder, tobacco use disorder, Opiate use disorder severe sustained remission





Interim history:  I reviewed the medical record and interviewed the patient.  He

is a 30-year-old single  male who has a history of an alcohol use 

disorder.  His manager brought him to the emergency department on 08/19/2021 

with concerns about his well-being.  According to the initial psychiatric 

consultant he presented to the ED for worsening depression and suicidal 

ideation.  





However, during our interview the patient denied that he is experiencing 

suicidal ideation or worsening depression.  He also specifically denied that he 

was currently having thoughts of death or suicide.  He was concerned about his 

alcohol use and believes that he experiences alcohol withdrawal seizure 

resulting in contusions and lacerations to his face.  Note that his serum 

alcohol level was less than 10 on admission to the medical unit (supporting his 

history that he had stopped his all call use prior to his presentation).  

Overall, his other laboratory studies were consistent with chronic alcohol use 

including elevated bilirubin, AST, ALT, alkaline phosphatase lipase.  He 

described chronic thoughts of death that he attributed to the death of his 

fiance from a heroin overdose several years ago.





We verified through [employer] that he is actively engaged with services 

through community mental health.  [facility] reported that his  

brought him to the hospital because she was concerned about his uncontrolled 

alcohol use.





He denied use of other drugs with the exception of marijuana and his UDS only 

positive for marijuana and tricyclic antidepressants (he is taking Seroquel at 

nighttime).  He denied auditory, visual or olfactory hallucinations, ideas 

reference, thought insertion, thought casting or thought control.  He denied 

history of suicide attempts or gestures.





We discussed his alcohol use and alcohol use problems.  He expressed interest in

engaging in substance abuse services and would consider participation in another

residential substance abuse treatment program (he has participated approximately

8 residential substance abuse treatment programs).  He blamed that be have been 

in contact with [facility] who will follow him once he leaves the hospital





Mental status exam: He presented as a somewhat disheveled appearing 30-year-old 

 male with contusions and abrasions on his face.  He made eye contact 

and appeared to attend to interview.  He had a blunted facial expression.  He 

has psychomotor retardation but no abnormal involuntary movements.  Her speech 

was spontaneous with normal rate and rhythm.  He was not dysarthric.  His affect

was anxious.  He denied suicidal ideation, death wishes homicidal ideation.  He 

denied feeling hopeless, helpless or worthless.  He ruminated about his living 

situation.  Apparently he has been served an eviction notice and must be out of 

his apartment by the end of this month.  He did not express ideas reference, 

paranoid ideation or delusions.  His thinking was concrete but his associations 

were coherent and goal directed.  He denied hallucinations did not appear to be 

responding to internal stimuli.





Assessment: He has a chronic mental illness, chronic substance abuse problem.  

He is actively engaged in outpatient mental health and substance abuse services.

 I suspect that she incurred an alcohol withdrawal seizure prior to admission.  

He is denying depression or thoughts of harm to himself or others.  There is no 

evidence of major mood or thought disturbances at this time.  He does not 

currently meet judicial criteria for involuntary hospitalization and consented 

to a voluntary transfer to the psychiatric unit.





Plan: Completed a negative clinical certificate.  [facility] will contact him

tomorrow.  He has felt appointments with his therapist.

## 2021-09-01 ENCOUNTER — HOSPITAL ENCOUNTER (INPATIENT)
Dept: HOSPITAL 47 - EC | Age: 31
LOS: 3 days | Discharge: HOME | DRG: 896 | End: 2021-09-04
Payer: COMMERCIAL

## 2021-09-01 DIAGNOSIS — F60.3: ICD-10-CM

## 2021-09-01 DIAGNOSIS — F90.9: ICD-10-CM

## 2021-09-01 DIAGNOSIS — F98.8: ICD-10-CM

## 2021-09-01 DIAGNOSIS — F17.200: ICD-10-CM

## 2021-09-01 DIAGNOSIS — F10.231: ICD-10-CM

## 2021-09-01 DIAGNOSIS — F10.229: Primary | ICD-10-CM

## 2021-09-01 DIAGNOSIS — G31.2: ICD-10-CM

## 2021-09-01 DIAGNOSIS — G43.909: ICD-10-CM

## 2021-09-01 DIAGNOSIS — E87.6: ICD-10-CM

## 2021-09-01 DIAGNOSIS — F42.9: ICD-10-CM

## 2021-09-01 DIAGNOSIS — Z90.49: ICD-10-CM

## 2021-09-01 DIAGNOSIS — K21.9: ICD-10-CM

## 2021-09-01 DIAGNOSIS — E83.51: ICD-10-CM

## 2021-09-01 DIAGNOSIS — G40.909: ICD-10-CM

## 2021-09-01 DIAGNOSIS — I10: ICD-10-CM

## 2021-09-01 DIAGNOSIS — F31.9: ICD-10-CM

## 2021-09-01 DIAGNOSIS — E83.42: ICD-10-CM

## 2021-09-01 DIAGNOSIS — E87.1: ICD-10-CM

## 2021-09-01 DIAGNOSIS — G92: ICD-10-CM

## 2021-09-01 DIAGNOSIS — K70.10: ICD-10-CM
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
## 2021-09-01 DIAGNOSIS — F41.9: ICD-10-CM

## 2021-09-01 DIAGNOSIS — Z79.82: ICD-10-CM

## 2021-09-01 LAB
ALBUMIN SERPL-MCNC: 2.8 G/DL (ref 3.5–5)
ALBUMIN SERPL-MCNC: 3.2 G/DL (ref 3.5–5)
ALP SERPL-CCNC: 361 U/L (ref 38–126)
ALP SERPL-CCNC: 404 U/L (ref 38–126)
ALT SERPL-CCNC: 101 U/L (ref 4–49)
ALT SERPL-CCNC: 112 U/L (ref 4–49)
ANION GAP SERPL CALC-SCNC: 13 MMOL/L
ANION GAP SERPL CALC-SCNC: 8 MMOL/L
AST SERPL-CCNC: 344 U/L (ref 17–59)
AST SERPL-CCNC: 409 U/L (ref 17–59)
BASOPHILS # BLD AUTO: 0.2 K/UL (ref 0–0.2)
BASOPHILS NFR BLD AUTO: 2 %
BUN SERPL-SCNC: 3 MG/DL (ref 9–20)
BUN SERPL-SCNC: 4 MG/DL (ref 9–20)
CALCIUM SPEC-MCNC: 6.5 MG/DL (ref 8.4–10.2)
CALCIUM SPEC-MCNC: 7.6 MG/DL (ref 8.4–10.2)
CHLORIDE SERPL-SCNC: 105 MMOL/L (ref 98–107)
CHLORIDE SERPL-SCNC: 105 MMOL/L (ref 98–107)
CO2 SERPL-SCNC: 23 MMOL/L (ref 22–30)
CO2 SERPL-SCNC: 24 MMOL/L (ref 22–30)
EOSINOPHIL # BLD AUTO: 0.1 K/UL (ref 0–0.7)
EOSINOPHIL NFR BLD AUTO: 1 %
ERYTHROCYTE [DISTWIDTH] IN BLOOD BY AUTOMATED COUNT: 4.49 M/UL (ref 4.3–5.9)
ERYTHROCYTE [DISTWIDTH] IN BLOOD: 13.8 % (ref 11.5–15.5)
GLUCOSE SERPL-MCNC: 104 MG/DL (ref 74–99)
GLUCOSE SERPL-MCNC: 127 MG/DL (ref 74–99)
HCT VFR BLD AUTO: 47.7 % (ref 39–53)
HGB BLD-MCNC: 16.6 GM/DL (ref 13–17.5)
LYMPHOCYTES # SPEC AUTO: 0.9 K/UL (ref 1–4.8)
LYMPHOCYTES NFR SPEC AUTO: 10 %
MAGNESIUM SPEC-SCNC: 1.6 MG/DL (ref 1.6–2.3)
MCH RBC QN AUTO: 37 PG (ref 25–35)
MCHC RBC AUTO-ENTMCNC: 34.8 G/DL (ref 31–37)
MCV RBC AUTO: 106.3 FL (ref 80–100)
MONOCYTES # BLD AUTO: 0.4 K/UL (ref 0–1)
MONOCYTES NFR BLD AUTO: 4 %
NEUTROPHILS # BLD AUTO: 7.5 K/UL (ref 1.3–7.7)
NEUTROPHILS NFR BLD AUTO: 81 %
PLATELET # BLD AUTO: 187 K/UL (ref 150–450)
POTASSIUM SERPL-SCNC: 3.4 MMOL/L (ref 3.5–5.1)
POTASSIUM SERPL-SCNC: 3.7 MMOL/L (ref 3.5–5.1)
PROT SERPL-MCNC: 5.4 G/DL (ref 6.3–8.2)
PROT SERPL-MCNC: 5.9 G/DL (ref 6.3–8.2)
SODIUM SERPL-SCNC: 136 MMOL/L (ref 137–145)
SODIUM SERPL-SCNC: 142 MMOL/L (ref 137–145)
WBC # BLD AUTO: 9.2 K/UL (ref 3.8–10.6)

## 2021-09-01 PROCEDURE — 80053 COMPREHEN METABOLIC PANEL: CPT

## 2021-09-01 PROCEDURE — 96361 HYDRATE IV INFUSION ADD-ON: CPT

## 2021-09-01 PROCEDURE — 36415 COLL VENOUS BLD VENIPUNCTURE: CPT

## 2021-09-01 PROCEDURE — 83735 ASSAY OF MAGNESIUM: CPT

## 2021-09-01 PROCEDURE — 81003 URINALYSIS AUTO W/O SCOPE: CPT

## 2021-09-01 PROCEDURE — 71045 X-RAY EXAM CHEST 1 VIEW: CPT

## 2021-09-01 PROCEDURE — 82075 ASSAY OF BREATH ETHANOL: CPT

## 2021-09-01 PROCEDURE — 96365 THER/PROPH/DIAG IV INF INIT: CPT

## 2021-09-01 PROCEDURE — 99285 EMERGENCY DEPT VISIT HI MDM: CPT

## 2021-09-01 PROCEDURE — 80320 DRUG SCREEN QUANTALCOHOLS: CPT

## 2021-09-01 PROCEDURE — 85025 COMPLETE CBC W/AUTO DIFF WBC: CPT

## 2021-09-01 RX ADMIN — Medication SCH MG: at 17:38

## 2021-09-01 RX ADMIN — MIRTAZAPINE SCH MG: 15 TABLET, FILM COATED ORAL at 20:54

## 2021-09-01 RX ADMIN — HEPARIN SODIUM SCH UNIT: 5000 INJECTION INTRAVENOUS; SUBCUTANEOUS at 20:54

## 2021-09-01 RX ADMIN — VORTIOXETINE SCH MG: 20 TABLET, FILM COATED ORAL at 20:54

## 2021-09-01 RX ADMIN — MAGNESIUM SULFATE IN DEXTROSE SCH MLS/HR: 10 INJECTION, SOLUTION INTRAVENOUS at 13:57

## 2021-09-01 RX ADMIN — MAGNESIUM SULFATE IN DEXTROSE SCH MLS/HR: 10 INJECTION, SOLUTION INTRAVENOUS at 15:20

## 2021-09-01 NOTE — HP
HISTORY AND PHYSICAL



I am covering for Dr. Mcdaniel.



DATE OF SERVICE:

09/01/2021



HISTORY OF PRESENT ILLNESS:

This 30-year-old gentleman with a past medical history of GERD, seizure disorder,

migraine, OCD, history of cholecystectomy, ADHD, anxiety, bipolar, being followed by

Dr. Mcdaniel in the outpatient setting, apparently was drinking 2-3 pints of alcohol a

day.  The patient presented to emergency room highly intoxicated.  The counselor called

EMS and EMS brought the patient to [facility].  The patient is extremely

intoxicated, though confused and conscious.  The patient was admitted for evaluation

and treatment.  The alcohol level was found to be 524.  A detailed history cannot be

taken from the patient because he is drowsy on and off.  Multiple lab abnormalities

were noted.



PAST MEDICAL HISTORY:

History of GERD, history seizure disorder, migraine, OCD, appendectomy, cholecystomy,

ADD, ADHD, anxiety, bipolar.



MEDICATIONS:

Home medications prior to admission include Trintellix, Seroquel, Protonix, Remeron,

Excedrin.



ALLERGIES:

NONE.



Family history, social history, review of systems could not be taken; the patient is

rather drowsy. Hypertension and myocardial infarction in family history per chart.



PHYSICAL EXAMINATION:

Pulse is 111, blood pressure 125/82, respiration 18, temperature 98 degrees, pulse ox

94% on room air.

HEENT: Conjunctivae normal.

NECK: No jugular venous distention.

CARDIOVASCULAR: S1, S2 muffled.

RESPIRATION: Breath sounds diminished at the bases.  A few scattered rhonchi and

crackles.

ABDOMEN: Soft, nontender. No mass palpable.

LEGS: No edema. No swelling.

NERVOUS SYSTEM: Full exam cannot be done.

SKIN: No ulcer, rash, bleeding.

JOINTS: No active deforming arthropathy.

LYMPHATICS: No lymph node palpable in neck, axillae or groin.



LABS:

WBC 9.3, hemoglobin 16.6 and .  Sodium 142, potassium 3.7, and calcium is 7.6.

Total bilirubin is 1.4, AST is 409, ALT is 112, alkaline phosphatase is 404 and serum

alcohol 525.



ASSESSMENT:

1. Acute alcohol intoxication.

2. Change in mental status, acute metabolic and toxic encephalopathy secondary to

    alcohol.

3. Acute alcoholic hepatitis.

4. Gastroesophageal reflux disease.

5. History of seizure disorder.

6. Migraine and OCD.

7. History of cholecystectomy.

8. Attention deficit disorder, attention deficit hyperactivity disorder, anxiety,

    bipolar.

9. Borderline personality disorder.

10.History of nicotine dependence.

11.History of THC.



RECOMMENDATIONS AND DISCUSSION:

In this is a 30-year-old gentleman who presented with multiple complex medical issues,

we will monitor the patient closely, continue the current medications, continue

symptomatic treatment. We will initiate CIWA protocol. Watch for any withdrawal

symptoms.  Resume the home medications.  Guarded prognosis because of multiple complex

medical issues.  Patient will need alcohol rehab.   and Case Management to

follow. A copy of this dictation is being forwarded to Dr. Mcdaniel, who is the primary

physician.





MMNOLBERTOL / LOUISN: [id number] / Job#: [id number]

## 2021-09-01 NOTE — ED
General Adult HPI





- General


Chief complaint: Alcohol


Stated complaint: ETOH


Time Seen by Provider: 09/01/21 09:35


Source: patient, EMS, RN notes reviewed, old records reviewed


Mode of arrival: EMS





- History of Present Illness


Initial comments: 





This is a 30-year-old male who presents emergency department highly intoxicated.

 Counselor called EMS and EMS arrived patient had no complaints other than being

extremely intoxicated.  Patient was post to follow-up with rehabilitation Center

today but obviously in his current state he was unable to do so.  Patient denies

any recent injury or trauma.  Patient denies any chest pain difficulty breathing

shortest breath per patient denies any recent fever chills per patient denies 

any vomiting nausea or diarrhea.  Patient denies any abdominal pain.





- Related Data


                                Home Medications











 Medication  Instructions  Recorded  Confirmed


 


QUEtiapine FUMARATE [SEROquel] 600 mg PO HS 06/02/21 09/01/21


 


Vortioxetine Hydrobromide 20 mg PO HS 06/02/21 09/01/21





[Trintellix]   


 


Aspirin/Acetaminophen/Caffeine 1 tab PO DAILY PRN 08/19/21 09/01/21





[Excedrin Migraine Caplet]   


 


Mirtazapine [Remeron] 15 mg PO HS 08/19/21 09/01/21








                                  Previous Rx's











 Medication  Instructions  Recorded


 


Pantoprazole Sodium [Protonix] 40 mg PO DAILY 24 Days #24 03/13/21





 tablet. 











                                    Allergies











Allergy/AdvReac Type Severity Reaction Status Date / Time


 


No Known Allergies Allergy   Verified 08/19/21 12:03














Review of Systems


ROS Statement: 


Those systems with pertinent positive or pertinent negative responses have been 

documented in the HPI.





ROS Other: All systems not noted in ROS Statement are negative.





Past Medical History


Past Medical History: GERD/Reflux, Seizure Disorder


Additional Past Medical History / Comment(s): migraines, OCD


History of Any Multi-Drug Resistant Organisms: None Reported


Past Surgical History: Appendectomy, Cholecystectomy, Hernia Repair, 

Tonsillectomy


Additional Past Surgical History / Comment(s): 7 hernias


Past Anesthesia/Blood Transfusion Reactions: No Reported Reaction


Past Psychological History: ADD/ADHD, Anxiety, Bipolar


Smoking Status: Current every day smoker


Past Alcohol Use History: Abuse, Daily, Heavy


Past Drug Use History: Marijuana





General Exam





- General Exam Comments


Initial Comments: 





GENERAL:


Patient is well-developed and well-nourished.  Patient is nontoxic and well-

hydrated and is in no acute distress.  Patient is highly intoxicated





ENT:


Neck is soft and supple.  No significant lymphadenopathy is noted.  Oropharynx 

is clear.  Moist mucous membranes.  Neck has full range of motion without 

eliciting any pain. 





EYES:


The sclera were anicteric and conjunctiva were pink and moist.  Extraocular 

movements were intact and pupils were equal round and reactive to light.  

Eyelids were unremarkable.





PULMONARY:


Unlabored respirations.  Good breath sounds bilaterally.  No audible rales 

rhonchi or wheezing was noted.





CARDIOVASCULAR:


There is a regular rate and rhythm without any murmurs gallops or rubs.





ABDOMEN:


Soft and nontender with normal bowel sounds.





SKIN:


Skin is clear with no lesions or rashes and otherwise unremarkable.





NEUROLOGIC:


Patient is alert and oriented x3.  Cranial nerves II through XII are grossly 

intact.  Motor and sensory are also intact.  Normal speech, volume and content. 

 Symmetrical smile. 





MUSCULOSKELETAL:


Normal extremities with adequate strength and full range of motion.





LYMPHATICS:


No significant lymphadenopathy is noted





PSYCHIATRIC:


Normal psychiatric evaluation.  Patient denies any suicidal ideations or 

attempts





Course


                                   Vital Signs











  09/01/21





  09:34


 


Temperature 98.4 F


 


Pulse Rate 98


 


Respiratory 18





Rate 


 


Blood Pressure 119/84


 


O2 Sat by Pulse 94 L





Oximetry 














Medical Decision Making





- Medical Decision Making





I spoke with Dr. Murillo he agreed to admit the patient admitted the patient 

wrote admitting orders.





Patient was hypomagnesemic and there are placed some magnesium 2 g of magnesium 

sulfate





I wrote orders and admitted the patient





- Lab Data


Result diagrams: 


                                 09/01/21 10:00





                                 09/01/21 10:00


                                   Lab Results











  09/01/21 09/01/21 Range/Units





  10:00 10:00 


 


WBC  9.2   (3.8-10.6)  k/uL


 


RBC  4.49   (4.30-5.90)  m/uL


 


Hgb  16.6  D   (13.0-17.5)  gm/dL


 


Hct  47.7   (39.0-53.0)  %


 


MCV  106.3 H   (80.0-100.0)  fL


 


MCH  37.0 H   (25.0-35.0)  pg


 


MCHC  34.8   (31.0-37.0)  g/dL


 


RDW  13.8   (11.5-15.5)  %


 


Plt Count  187  D   (150-450)  k/uL


 


MPV  7.5   


 


Neutrophils %  81   %


 


Lymphocytes %  10   %


 


Monocytes %  4   %


 


Eosinophils %  1   %


 


Basophils %  2   %


 


Neutrophils #  7.5   (1.3-7.7)  k/uL


 


Lymphocytes #  0.9 L   (1.0-4.8)  k/uL


 


Monocytes #  0.4   (0-1.0)  k/uL


 


Eosinophils #  0.1   (0-0.7)  k/uL


 


Basophils #  0.2   (0-0.2)  k/uL


 


Macrocytosis  Moderate   


 


Sodium   142  (137-145)  mmol/L


 


Potassium   3.7  (3.5-5.1)  mmol/L


 


Chloride   105  ()  mmol/L


 


Carbon Dioxide   24  (22-30)  mmol/L


 


Anion Gap   13  mmol/L


 


BUN   4 L  (9-20)  mg/dL


 


Creatinine   0.56 L  (0.66-1.25)  mg/dL


 


Est GFR (CKD-EPI)AfAm   >90  (>60 ml/min/1.73 sqM)  


 


Est GFR (CKD-EPI)NonAf   >90  (>60 ml/min/1.73 sqM)  


 


Glucose   104 H  (74-99)  mg/dL


 


Calcium   7.6 L  (8.4-10.2)  mg/dL


 


Magnesium   1.6  (1.6-2.3)  mg/dL


 


Total Bilirubin   1.4 H  (0.2-1.3)  mg/dL


 


AST   409 H  (17-59)  U/L


 


ALT   112 H  (4-49)  U/L


 


Alkaline Phosphatase   404 H  ()  U/L


 


Total Protein   5.9 L  (6.3-8.2)  g/dL


 


Albumin   3.2 L  (3.5-5.0)  g/dL


 


Serum Alcohol   524 H*  mg/dL














Disposition


Clinical Impression: 


 Alcoholic intoxication, Hypomagnesemia





Disposition: ADMITTED AS IP TO THIS HOSP


Referrals: 


Mahseh Mcdaniel MD [Primary Care Provider] - 1-2 days


Time of Disposition: 11:35

## 2021-09-02 LAB
BASOPHILS # BLD AUTO: 0 K/UL (ref 0–0.2)
BASOPHILS NFR BLD AUTO: 0 %
EOSINOPHIL # BLD AUTO: 0 K/UL (ref 0–0.7)
EOSINOPHIL NFR BLD AUTO: 0 %
ERYTHROCYTE [DISTWIDTH] IN BLOOD BY AUTOMATED COUNT: 3.5 M/UL (ref 4.3–5.9)
ERYTHROCYTE [DISTWIDTH] IN BLOOD: 13.5 % (ref 11.5–15.5)
HCT VFR BLD AUTO: 37.2 % (ref 39–53)
HGB BLD-MCNC: 13.4 GM/DL (ref 13–17.5)
LYMPHOCYTES # SPEC AUTO: 0.6 K/UL (ref 1–4.8)
LYMPHOCYTES NFR SPEC AUTO: 8 %
MCH RBC QN AUTO: 38.3 PG (ref 25–35)
MCHC RBC AUTO-ENTMCNC: 36.1 G/DL (ref 31–37)
MCV RBC AUTO: 106.2 FL (ref 80–100)
MONOCYTES # BLD AUTO: 0.2 K/UL (ref 0–1)
MONOCYTES NFR BLD AUTO: 3 %
NEUTROPHILS # BLD AUTO: 6.6 K/UL (ref 1.3–7.7)
NEUTROPHILS NFR BLD AUTO: 88 %
PLATELET # BLD AUTO: 84 K/UL (ref 150–450)
WBC # BLD AUTO: 7.6 K/UL (ref 3.8–10.6)

## 2021-09-02 RX ADMIN — FOLIC ACID SCH MG: 1 TABLET ORAL at 14:57

## 2021-09-02 RX ADMIN — Medication SCH MG: at 06:30

## 2021-09-02 RX ADMIN — MIRTAZAPINE SCH MG: 15 TABLET, FILM COATED ORAL at 20:37

## 2021-09-02 RX ADMIN — ACETAMINOPHEN PRN MG: 500 TABLET ORAL at 01:30

## 2021-09-02 RX ADMIN — PANTOPRAZOLE SODIUM SCH MG: 40 TABLET, DELAYED RELEASE ORAL at 06:30

## 2021-09-02 RX ADMIN — THERA TABS SCH EACH: TAB at 14:57

## 2021-09-02 RX ADMIN — HEPARIN SODIUM SCH: 5000 INJECTION INTRAVENOUS; SUBCUTANEOUS at 08:28

## 2021-09-02 RX ADMIN — METOPROLOL TARTRATE SCH MG: 25 TABLET, FILM COATED ORAL at 20:37

## 2021-09-02 RX ADMIN — ACETAMINOPHEN PRN MG: 500 TABLET ORAL at 08:26

## 2021-09-02 RX ADMIN — VORTIOXETINE SCH MG: 20 TABLET, FILM COATED ORAL at 20:37

## 2021-09-02 RX ADMIN — ACETAMINOPHEN, ASPIRIN, CAFFEINE PRN EACH: 250; 250; 65 TABLET, FILM COATED ORAL at 14:58

## 2021-09-02 RX ADMIN — Medication SCH MG: at 14:57

## 2021-09-02 RX ADMIN — HEPARIN SODIUM SCH UNIT: 5000 INJECTION INTRAVENOUS; SUBCUTANEOUS at 20:37

## 2021-09-02 NOTE — XR
EXAMINATION: XR chest 1V portable

DATE AND TIME:  9/2/2021 8:25 PM

 

CLINICAL INDICATION: PHH; chf

 

TECHNIQUE: AP portable upright

 

COMPARISON: None

 

FINDINGS: 

There is prominent elevation of the right hemidiaphragm, etiology unclear. This does not allow visual
ization of the lower one half of the right lung parenchyma.

 

There is a horizontal linear band of added opacity, over this elevated hemidiaphragm, consistent with
 discoid subsegmental atelectasis. On the contralateral left, there are 2 horizontally oriented small
 linear bands of added opacity consistent with discoid subsegmental atelectasis.

 

Otherwise, the lungs are clear and well expanded.

 

The pleural spaces are negative as seen.

 

The cardiac silhouette is not enlarged. The remainder of the mediastinal silhouette is unremarkable. 


 

The skeletal structures and soft tissues are negative for acute findings.

 

IMPRESSION:

Limited study, as above.

## 2021-09-02 NOTE — PN
PROGRESS NOTE



I am covering for Dr. Mcdaniel.



DATE OF SERVICE:

09/02/2021



This 30-year-old gentleman, admitted with alcoholic intoxication, is being closely

monitored.  Patient continues to be slightly drowsy at this time.



PHYSICAL EXAMINATION:

Arousable. Pulse is 116, blood pressure 133/81, respirations 16, temperature 99.2,

pulse ox 94% on room air.

HEENT: Conjunctivae normal.

NECK: No jugular venous distention.

CARDIOVASCULAR: S1, S2 muffled.

RESPIRATION: Breath sounds diminished at the bases.  Scattered rhonchi.

ABDOMEN: Soft.

NERVOUS SYSTEM: No focal deficit.



LABS:

Sodium 136, potassium 3.4.  Other labs are noted.



ASSESSMENT:

1. Acute alcohol intoxication.

2. Change in mental status, acute metabolic encephalopathy secondary to alcohol.

3. Tachycardia.

4. Acute alcohol hepatitis.

5. Gastroesophageal reflux disease.

6. History of seizure disorder.

7. Hypocalcemia.

8. Migraine and OCD.

9. History of cholecystectomy.

10.Attention deficit disorder, attention deficit hyperactivity disorder, anxiety,

    bipolar.

11.Borderline personality disorder.

12.History of nicotine dependence.

13.History of THC.

14.Hyponatremia.

15.Hypokalemia.



RECOMMENDATIONS AND DISCUSSION:

I recommend to continue current medications, continue with symptomatic treatment.

Recommend to continue current management.  Will continue to monitor.  Repeat labs in

the morning.  Guarded prognosis because of multiple complex medical issues. Further

recommendations to follow.





MMODL / LOUISN: [id number] / Job#: [id number]

## 2021-09-03 LAB
ALBUMIN SERPL-MCNC: 3 G/DL (ref 3.5–5)
ALBUMIN SERPL-MCNC: 3.4 G/DL (ref 3.5–5)
ALP SERPL-CCNC: 365 U/L (ref 38–126)
ALP SERPL-CCNC: 434 U/L (ref 38–126)
ALT SERPL-CCNC: 76 U/L (ref 4–49)
ALT SERPL-CCNC: 86 U/L (ref 4–49)
ANION GAP SERPL CALC-SCNC: 6 MMOL/L
ANION GAP SERPL CALC-SCNC: 8 MMOL/L
AST SERPL-CCNC: 204 U/L (ref 17–59)
AST SERPL-CCNC: 209 U/L (ref 17–59)
BASOPHILS # BLD AUTO: 0 K/UL (ref 0–0.2)
BASOPHILS NFR BLD AUTO: 1 %
BUN SERPL-SCNC: 2 MG/DL (ref 9–20)
BUN SERPL-SCNC: <2 MG/DL (ref 9–20)
CALCIUM SPEC-MCNC: 8.3 MG/DL (ref 8.4–10.2)
CALCIUM SPEC-MCNC: 8.5 MG/DL (ref 8.4–10.2)
CHLORIDE SERPL-SCNC: 105 MMOL/L (ref 98–107)
CHLORIDE SERPL-SCNC: 108 MMOL/L (ref 98–107)
CO2 SERPL-SCNC: 22 MMOL/L (ref 22–30)
CO2 SERPL-SCNC: 23 MMOL/L (ref 22–30)
EOSINOPHIL # BLD AUTO: 0 K/UL (ref 0–0.7)
EOSINOPHIL NFR BLD AUTO: 1 %
ERYTHROCYTE [DISTWIDTH] IN BLOOD BY AUTOMATED COUNT: 3.73 M/UL (ref 4.3–5.9)
ERYTHROCYTE [DISTWIDTH] IN BLOOD: 13.7 % (ref 11.5–15.5)
GLUCOSE SERPL-MCNC: 102 MG/DL (ref 74–99)
GLUCOSE SERPL-MCNC: 96 MG/DL (ref 74–99)
HCT VFR BLD AUTO: 40.4 % (ref 39–53)
HGB BLD-MCNC: 13.8 GM/DL (ref 13–17.5)
KETONES UR QL STRIP.AUTO: (no result)
LYMPHOCYTES # SPEC AUTO: 0.8 K/UL (ref 1–4.8)
LYMPHOCYTES NFR SPEC AUTO: 14 %
MAGNESIUM SPEC-SCNC: 1.6 MG/DL (ref 1.6–2.3)
MCH RBC QN AUTO: 37.1 PG (ref 25–35)
MCHC RBC AUTO-ENTMCNC: 34.3 G/DL (ref 31–37)
MCV RBC AUTO: 108.2 FL (ref 80–100)
MONOCYTES # BLD AUTO: 0.2 K/UL (ref 0–1)
MONOCYTES NFR BLD AUTO: 3 %
NEUTROPHILS # BLD AUTO: 4.7 K/UL (ref 1.3–7.7)
NEUTROPHILS NFR BLD AUTO: 80 %
PH UR: 7.5 [PH] (ref 5–8)
PLATELET # BLD AUTO: 61 K/UL (ref 150–450)
POTASSIUM SERPL-SCNC: 3.5 MMOL/L (ref 3.5–5.1)
POTASSIUM SERPL-SCNC: 3.9 MMOL/L (ref 3.5–5.1)
PROT SERPL-MCNC: 5.8 G/DL (ref 6.3–8.2)
PROT SERPL-MCNC: 6.5 G/DL (ref 6.3–8.2)
SODIUM SERPL-SCNC: 135 MMOL/L (ref 137–145)
SODIUM SERPL-SCNC: 137 MMOL/L (ref 137–145)
SP GR UR: 1.01 (ref 1–1.03)
UROBILINOGEN UR QL STRIP: 2 MG/DL (ref ?–2)
WBC # BLD AUTO: 5.9 K/UL (ref 3.8–10.6)

## 2021-09-03 RX ADMIN — MAGNESIUM SULFATE IN DEXTROSE SCH MLS/HR: 10 INJECTION, SOLUTION INTRAVENOUS at 12:22

## 2021-09-03 RX ADMIN — HEPARIN SODIUM SCH UNIT: 5000 INJECTION INTRAVENOUS; SUBCUTANEOUS at 09:46

## 2021-09-03 RX ADMIN — Medication SCH MG: at 06:41

## 2021-09-03 RX ADMIN — METOPROLOL TARTRATE SCH MG: 25 TABLET, FILM COATED ORAL at 09:47

## 2021-09-03 RX ADMIN — HEPARIN SODIUM SCH UNIT: 5000 INJECTION INTRAVENOUS; SUBCUTANEOUS at 19:56

## 2021-09-03 RX ADMIN — THERA TABS SCH: TAB at 11:05

## 2021-09-03 RX ADMIN — Medication SCH MG: at 17:02

## 2021-09-03 RX ADMIN — POTASSIUM CHLORIDE SCH: 20 TABLET, EXTENDED RELEASE ORAL at 10:05

## 2021-09-03 RX ADMIN — METOPROLOL TARTRATE SCH MG: 25 TABLET, FILM COATED ORAL at 19:56

## 2021-09-03 RX ADMIN — NICOTINE SCH PATCH: 21 PATCH, EXTENDED RELEASE TRANSDERMAL at 04:00

## 2021-09-03 RX ADMIN — POTASSIUM CHLORIDE SCH MEQ: 20 TABLET, EXTENDED RELEASE ORAL at 09:47

## 2021-09-03 RX ADMIN — MAGNESIUM SULFATE IN DEXTROSE SCH MLS/HR: 10 INJECTION, SOLUTION INTRAVENOUS at 09:47

## 2021-09-03 RX ADMIN — NICOTINE SCH PATCH: 21 PATCH, EXTENDED RELEASE TRANSDERMAL at 09:46

## 2021-09-03 RX ADMIN — METOPROLOL TARTRATE SCH: 25 TABLET, FILM COATED ORAL at 10:04

## 2021-09-03 RX ADMIN — PANTOPRAZOLE SODIUM SCH MG: 40 TABLET, DELAYED RELEASE ORAL at 06:41

## 2021-09-03 RX ADMIN — ACETAMINOPHEN, ASPIRIN, CAFFEINE PRN EACH: 250; 250; 65 TABLET, FILM COATED ORAL at 04:00

## 2021-09-03 RX ADMIN — FOLIC ACID SCH: 1 TABLET ORAL at 11:05

## 2021-09-03 NOTE — P.CN
Psychiatric Consult





- .


Consult date: 09/03/21


Consult:: 





09/03/21 14:38


IDENTIFYING DATA: This patient is a single, on Social Security disability, 

30-year-old  male who presents emergency department for acute alcohol 

intoxication.  





HISTORY OF PRESENT ILLNESS: The patient presented to the hospital on 09/01/21, 

brought in to the emergency department for alcohol intoxication.  The patient 

was brought in by EMS.  The patient was reportedly supposed to go to 

rehabilitation for his alcohol use disorder but due to severe alcohol 

intoxication, was unable to do so.  The patient was petitioned due to concerns 

for suicidal ideation as the patient expressed suicidal thoughts while intox

icated.  Upon evaluation the emergency department, the patient's alcohol level 

was 524 (H).  Currently, the patient's CIWA score is at 13 and he displays 

autonomic changes including hypertension with a BP of 145/104 and a HR of 117.  

Psychiatry has been consulted for his alcohol use disorder, suicidal ideation, 

and the patient has been petition.


Upon evaluation by this provider, the patient is not endorsing any suicidal or 

homicidal ideation, intention, and/or plan.  The patient reported that he last 

attempted suicide 15 years ago by overdose.  The patient is future oriented and 

is requesting that he go to rehabilitation for his alcohol use disorder.  The 

patient does endorse that he has been feeling significant symptoms of depression

for many years prior to this admission to this hospital.  He states that he has 

low motivation, difficulty with sleep, hopelessness, helplessness, and elevated 

anxiety.  This occurs in the context of heavy alcohol use.  The patient reports 

that for the last 7 months, he has been drinking 2-3 pints of liquor per day.  

The patient states that he really wants to go to rehabilitation to quit his 

alcohol use and to "break the cycle."  The patient states that he has been 

following with [facility] in the outpatient setting but has not been taking his 

medications as prescribed.  He does not endorse any significant symptoms of 

psychosis.  He reports no auditory or visual hallucinations.  He denies any 

significant history of bipolar disorder.  He reports no increased goal-directed 

activity, excessive energy, grandiosity, or pressured speech.


The patient does endorse significant history of trauma.  He reports that his 

fianc passed away in 2019 while on the phone with him by overdosing on heroin. 

He expresses that he continues to grieve this loss.  He endorses flashbacks and 

nightmares to this event.  The patient does have a significant history of heavy 

substance abuse.  Prior to his heavy alcohol use, the patient was using heroin 

daily.  He also reports a significant history of crack cocaine use, and illicit 

pills.





PAST PSYCHIATRIC HISTORY: Patient has a ADD/ADHD, anxiety, bipolar disorder, 

alcohol use, marijuana use, and nicotine dependence.  The patient is currently 

on a home regimen of trintellix, Seroquel, and Remeron.  The patient reports 4 

prior inpatient psychiatric hospitalizations, with the last time being in 2018 

at [facility] for suicidal ideation.  The patient was that he is open with 

[facility].  He reports one prior attempt at suicide 15 years ago.





PAST MEDICAL HISTORY: 


Past Medical History: GERD/Reflux, Seizure Disorder


Additional Past Medical History / Comment(s): migraines, OCD


History of Any Multi-Drug Resistant Organisms: None Reported


Past Surgical History: Appendectomy, Cholecystectomy, Hernia Repair, 

Tonsillectomy


Additional Past Surgical History / Comment(s): 7 hernias


Past Anesthesia/Blood Transfusion Reactions: No Reported Reaction


Past Psychological History: ADD/ADHD, Anxiety, Bipolar


Smoking Status: Current every day smoker


Past Alcohol Use History: Abuse, Daily, Heavy


Past Drug Use History: Marijuana








ALLERGIES: NO KNOWN DRUG ALLERGIES





CHEMICAL DEPENDENCY HISTORY: as per HPI. 





FAMILY PSYCHIATRIC/SUBSTANCE USE HISTORY: No reported family psychiatric or 

substance abuse history.





SOCIAL HISTORY: Patient states that he is currently single, never , and 

has no children.  He had a fianc who passed away in 2019 after she overdosed on

heroin.  He currently receives Social Security disability.  He reports that his 

father currently lives in [facility].  He reports that he was recently made 

homeless and is evicted from his previous home.  He expresses a strong desire to

go to inpatient rehabilitation.





MENTAL STATUS EXAM: 


General Appearance: Patient appears to be stated age is alert, pleasant, and 

cooperative. Patient appears to have slightly disheveled hygiene and grooming 

wearing hospital gown with fair eye contact.  Unkempt hair and beard.


Behavior: Patient is calmly lying in bed without any agitated behavior.  

Psychomotor activity is elevated.  Patient does display bilateral upper 

extremity tremors.


Speech: Patient's speech is fluent and nonpressured.  Spontaneous, with normal 

rate, tone, and volume.


Mood/Affect: Patient reports their mood is "depressed", affect is congruent, 

constricted, and withdrawn.


Suicidality/Homicidality:  Patient denies having any suicidal or homicidal 

ideation intent or plan.  


Perceptions: Patient denies any visual hallucinations and denies any auditory 

hallucinations  


Though content/process: There is no evidence of any delusional thought content 

and thought process is linear and goal-directed.  The patient is future oriented

and is wishing to quit substances.


Memory and concentration: AOX3, grossly intact for the purposes of this session.

Can spell "WORLD" backwards.


Judgment and insight: poor





                                   Vital Signs











Temp  98.2 F   09/03/21 08:00


 


Pulse  117 H  09/03/21 12:17


 


Resp  18   09/03/21 12:17


 


BP  145/104   09/03/21 12:17


 


Pulse Ox  95   09/03/21 12:17








                                 Intake & Output











 09/02/21 09/03/21 09/03/21





 18:59 06:59 18:59


 


Intake Total 236  0


 


Balance 236  0


 


Weight  75.5 kg 


 


Intake:   


 


  Oral 236  0


 


Other:   


 


  Voiding Method Urinal Toilet Toilet





  Urinal Urinal


 


  # Voids 4 3 2


 


  # Bowel Movements 1  








                       Laboratory Results - Last 24 Hours











  09/03/21 09/03/21





  07:14 07:14


 


WBC   5.9


 


RBC   3.73 L


 


Hgb   13.8


 


Hct   40.4


 


MCV   108.2 H


 


MCH   37.1 H


 


MCHC   34.3


 


RDW   13.7


 


Plt Count   61 L


 


MPV   10.2


 


Neutrophils %   80


 


Lymphocytes %   14


 


Monocytes %   3


 


Eosinophils %   1


 


Basophils %   1


 


Neutrophils #   4.7


 


Lymphocytes #   0.8 L


 


Monocytes #   0.2


 


Eosinophils #   0.0


 


Basophils #   0.0


 


Macrocytosis   Moderate


 


Sodium  137 


 


Potassium  3.5 


 


Chloride  108 H 


 


Carbon Dioxide  23 


 


Anion Gap  6 


 


BUN  <2 L 


 


Creatinine  0.42 L 


 


Est GFR (CKD-EPI)AfAm  >90 


 


Est GFR (CKD-EPI)NonAf  >90 


 


Glucose  102 H 


 


Calcium  8.3 L 


 


Magnesium  1.6 


 


Total Bilirubin  2.1 H 


 


AST  209 H 


 


ALT  76 H 


 


Alkaline Phosphatase  365 H 


 


Total Protein  5.8 L 


 


Albumin  3.0 L 











IMPRESSIONS: 


Depressive disorder, secondary to heavy alcohol use


Alcohol use disorder


Acute alcohol withdrawal


Posttraumatic stress disorder


Cluster B personality traits


Polysubstance abuse





PLAN: 


-At this time patient DOES NOT meet criteria for inpatient psychiatric 

admission.  Currently, the patient is future oriented and is not endorsing any 

imminent risk of harm to self or others.  He is not actively psychotic.  He is 

willing to seek treatment for his heavy alcohol use.





-Would recommend the following medication changes/additions: 


Start Valium 10 mg 4 times a day for alcohol withdrawal


Increase Remeron to 30 mg by mouth at bedtime for management of 

depression/insomnia/appetite


Decrease Seroquel to 400 mg by mouth at bedtime as any doses above 400 is less 

sedating and more activating.  We will use Seroquel to augment his 

antidepressant and to help with mood stabilization and insomnia.


Discontinue Trintellix as the patient is not adherent with this medication in 

the outpatient setting. Also concern for seizures. 





-Discontinue 1:1 sitter and suicide precautions. Patient may have his phone.





-This provider discussed with the patient at length his substance abuse and what

to expect while in recovery.  Psychoeducation was provided on the role of 

medications and therapy.  We discussed at length how alcohol affects mood, 

sleep, anxiety, and how sobriety is necessary in order to address his mental 

health symptoms.





-Recommend referral for inpatient substance abuse rehabilitation as per patient 

request.





-Continue CIWA protocol with Ativan when necessary





-Psychiatry will sign off at this point, please contact with any questions.








09/03/21 14:38

## 2021-09-03 NOTE — PN
PROGRESS NOTE



DATE OF SERVICE:

09/03/2021



I am covering for Dr. Mcdaniel.



This 30-year-old gentleman admitted with significant alcoholism also has significant

depression also.  The patient is apparently petitioned. The patient mildly confused.

The chest x-ray shows limited study.  No chest pain.  No palpitations.  No fever.



PHYSICAL EXAMINATION:

Alert, oriented times two. Pulse 117.  Blood pressure 145/104.  Respiration 18.  Temp

98.2, pulse ox 94% on room air. HEENT: Conjunctivae normal. Neck: No JVD.

Cardiovascular: S1, S2.  Respiration: Breath sounds diminished in the bases.  Scattered

rhonchi.  Abdomen:  Soft. Nervous system: No  focal deficits.



LAB STUDIES:

WBC 5.3, hemoglobin 13.8. Sodium 130, potassium 3.5.  Total bilirubin is 2.1, AST/ALT

noted. Alcohol 524.



ASSESSMENT:

1. Acute alcohol intoxication.

2. Acute delirium tremens.

3. Change in mental status, acute metabolic encephalopathy, multifactorial.

4. Tachycardia sinus.

5. Hypertension.

6. Acute alcoholic hepatitis.

7. Gastroesophageal reflux disease.

8. History of seizure disorder.

9. Hypocalcemia.

10.Migraine.

11.OCD.

12.History of cholecystectomy.

13.Attention-deficit disorder/attention-deficit/hyperactivity disorder.

14.History of borderline personality disorder.

15.History of nicotine dependence.

16.History of THC.

17.Hyponatremia.

18.Hypokalemia.



RECOMMENDATIONS AND DISCUSSION:

Continue current medications, management and symptomatic treatment.  We will add

clonidine to the current regimen.  Continue the rest of the medications.  CIWA

protocol.  Otherwise, guarded prognosis because of multiple complex medical issues.

Further recommendations to follow. Psychiatric consultation, possible inpatient psych.

Patient has been petitioned by the patient's therapist.





MMODL / IJN: [id number] / Job#: [id number]

## 2021-09-04 VITALS
TEMPERATURE: 97.8 F | SYSTOLIC BLOOD PRESSURE: 123 MMHG | HEART RATE: 101 BPM | DIASTOLIC BLOOD PRESSURE: 88 MMHG | RESPIRATION RATE: 18 BRPM

## 2021-09-04 LAB
BASOPHILS # BLD AUTO: 0.1 K/UL (ref 0–0.2)
BASOPHILS NFR BLD AUTO: 1 %
EOSINOPHIL # BLD AUTO: 0.1 K/UL (ref 0–0.7)
EOSINOPHIL NFR BLD AUTO: 2 %
ERYTHROCYTE [DISTWIDTH] IN BLOOD BY AUTOMATED COUNT: 4.1 M/UL (ref 4.3–5.9)
ERYTHROCYTE [DISTWIDTH] IN BLOOD: 13.7 % (ref 11.5–15.5)
HCT VFR BLD AUTO: 44.4 % (ref 39–53)
HGB BLD-MCNC: 15.4 GM/DL (ref 13–17.5)
LYMPHOCYTES # SPEC AUTO: 1.1 K/UL (ref 1–4.8)
LYMPHOCYTES NFR SPEC AUTO: 19 %
MCH RBC QN AUTO: 37.4 PG (ref 25–35)
MCHC RBC AUTO-ENTMCNC: 34.6 G/DL (ref 31–37)
MCV RBC AUTO: 108.2 FL (ref 80–100)
MONOCYTES # BLD AUTO: 0.2 K/UL (ref 0–1)
MONOCYTES NFR BLD AUTO: 4 %
NEUTROPHILS # BLD AUTO: 4.3 K/UL (ref 1.3–7.7)
NEUTROPHILS NFR BLD AUTO: 72 %
PLATELET # BLD AUTO: 74 K/UL (ref 150–450)
WBC # BLD AUTO: 6 K/UL (ref 3.8–10.6)

## 2021-09-04 RX ADMIN — NICOTINE SCH PATCH: 21 PATCH, EXTENDED RELEASE TRANSDERMAL at 09:16

## 2021-09-04 RX ADMIN — HEPARIN SODIUM SCH UNIT: 5000 INJECTION INTRAVENOUS; SUBCUTANEOUS at 09:16

## 2021-09-04 RX ADMIN — Medication SCH MG: at 06:35

## 2021-09-04 RX ADMIN — FOLIC ACID SCH MG: 1 TABLET ORAL at 12:36

## 2021-09-04 RX ADMIN — PANTOPRAZOLE SODIUM SCH MG: 40 TABLET, DELAYED RELEASE ORAL at 06:35

## 2021-09-04 RX ADMIN — THERA TABS SCH EACH: TAB at 12:36

## 2021-09-04 RX ADMIN — METOPROLOL TARTRATE SCH MG: 25 TABLET, FILM COATED ORAL at 09:16

## 2021-09-04 NOTE — DS
DISCHARGE SUMMARY



DATE OF SERVICE:

09/04/2021.



I am covering for Dr. Mcdaniel.



FINAL DIAGNOSES:

1. Acute alcohol intoxication.

2. Acute delirium tremens.

3. Change in mental status acute metabolic encephalopathy multifactorial.

4. Tachycardia, sinus improved.

5. Hypertension.

6. Acute alcoholic hepatitis.

7. Gastroesophageal reflux disease.

8. History of seizure disorder.

9. Hypocalcemia.

10.Migraines.

11.History of OCD.

12.History of cholecystectomy.

13.History of attention-deficit disorder and attention-deficit/hyperactivity disorder.

14.History of borderline personality disorder.

15.History of nicotine dependence.

16.History of THC.

17.Hyponatremia.

18.Hypokalemia.



DISCHARGE DISPOSITION:

The patient being discharged in stable condition with guarded prognosis.  The patient

is extremely keen on going home.  Psychiatry has cleared the patient for discharge.



HISTORY OF PRESENT ILLNESS:

This 30-year-old gentleman with a past medical history of multiple medical problems

admitted with acute alcohol intoxication as well as acute delirium tremens.  Patient

was apparently petitioned but however psychiatric cleared the patient for discharge.

The patient is supposed to go into rehab tomorrow, attend inpatient rehab, alcohol

rehab tomorrow.  At this time,  the patient is keen on going home.  The patient is

stable.



On exam, vitals signs stable.  Cardiovascular: S1, S2. Abdomen soft.  Nervous system:

No focal deficits.



DISCHARGE ADVICE AND MEDICATIONS:

1. Diet is regular.

2. Activity limited until follow up.

3. No EtOH.

4. Follow up with Dr. Mcdaniel in 1-2 days with CBC, CMP.

5. Drug and alcohol rehab as mentioned earlier, to attend AA.



DISCHARGE MEDICATIONS:

1. Aspirin as before.

2. Remeron 15 mg q.h.s.

3. Seroquel 60 mg q.h.s.

4. Vortioxetine 20 mg q.h.s.

5. Ativan 1 mg t.i.d. p.r.n.

6. Catapres 0.1 mg b.i.d.

7. Folic acid 1 mg.

8. Multivitamins one p.o. daily.

9. Protonix 40 mg daily.

10.Tylenol p.r.n.

11.Thiamine 100 mg p.o. daily.

Once again, the patient being discharged in stable with guarded prognosis.





MMODL / IJN: [id number] / Job#: [id number]